# Patient Record
Sex: FEMALE | Race: WHITE | Employment: OTHER | URBAN - METROPOLITAN AREA
[De-identification: names, ages, dates, MRNs, and addresses within clinical notes are randomized per-mention and may not be internally consistent; named-entity substitution may affect disease eponyms.]

---

## 2017-01-01 ENCOUNTER — TELEPHONE (OUTPATIENT)
Dept: FAMILY MEDICINE CLINIC | Age: 67
End: 2017-01-01

## 2017-01-01 ENCOUNTER — CARE COORDINATION (OUTPATIENT)
Dept: CARE COORDINATION | Age: 67
End: 2017-01-01

## 2017-01-01 ENCOUNTER — CARE COORDINATION (OUTPATIENT)
Dept: CASE MANAGEMENT | Age: 67
End: 2017-01-01

## 2017-01-01 ENCOUNTER — OFFICE VISIT (OUTPATIENT)
Dept: FAMILY MEDICINE CLINIC | Age: 67
End: 2017-01-01

## 2017-01-01 ENCOUNTER — CARE COORDINATION (OUTPATIENT)
Dept: MEDSURG UNIT | Age: 67
End: 2017-01-01

## 2017-01-01 ENCOUNTER — TELEPHONE (OUTPATIENT)
Dept: PHARMACY | Facility: CLINIC | Age: 67
End: 2017-01-01

## 2017-01-01 VITALS
WEIGHT: 189.4 LBS | SYSTOLIC BLOOD PRESSURE: 90 MMHG | BODY MASS INDEX: 34.85 KG/M2 | DIASTOLIC BLOOD PRESSURE: 60 MMHG | HEIGHT: 62 IN | HEART RATE: 89 BPM

## 2017-01-01 VITALS
HEART RATE: 78 BPM | SYSTOLIC BLOOD PRESSURE: 118 MMHG | WEIGHT: 190.2 LBS | BODY MASS INDEX: 34.79 KG/M2 | RESPIRATION RATE: 14 BRPM | DIASTOLIC BLOOD PRESSURE: 78 MMHG

## 2017-01-01 VITALS
RESPIRATION RATE: 15 BRPM | SYSTOLIC BLOOD PRESSURE: 102 MMHG | HEART RATE: 87 BPM | BODY MASS INDEX: 35.37 KG/M2 | WEIGHT: 187.2 LBS | DIASTOLIC BLOOD PRESSURE: 68 MMHG

## 2017-01-01 VITALS — DIASTOLIC BLOOD PRESSURE: 70 MMHG | OXYGEN SATURATION: 97 % | SYSTOLIC BLOOD PRESSURE: 110 MMHG | HEART RATE: 79 BPM

## 2017-01-01 DIAGNOSIS — Z09 HOSPITAL DISCHARGE FOLLOW-UP: Primary | ICD-10-CM

## 2017-01-01 DIAGNOSIS — M62.830 BACK SPASM: ICD-10-CM

## 2017-01-01 DIAGNOSIS — N18.31 CHRONIC KIDNEY DISEASE (CKD) STAGE G3A/A1, MODERATELY DECREASED GLOMERULAR FILTRATION RATE (GFR) BETWEEN 45-59 ML/MIN/1.73 SQUARE METER AND ALBUMINURIA CREATININE RATIO LESS THAN 30 MG/G (HCC): ICD-10-CM

## 2017-01-01 DIAGNOSIS — E11.22 TYPE 2 DIABETES MELLITUS WITH STAGE 3 CHRONIC KIDNEY DISEASE, WITHOUT LONG-TERM CURRENT USE OF INSULIN (HCC): Primary | ICD-10-CM

## 2017-01-01 DIAGNOSIS — R10.9 RIGHT FLANK PAIN: ICD-10-CM

## 2017-01-01 DIAGNOSIS — N18.31 CHRONIC KIDNEY DISEASE (CKD) STAGE G3A/A1, MODERATELY DECREASED GLOMERULAR FILTRATION RATE (GFR) BETWEEN 45-59 ML/MIN/1.73 SQUARE METER AND ALBUMINURIA CREATININE RATIO LESS THAN 30 MG/G (HCC): Primary | ICD-10-CM

## 2017-01-01 DIAGNOSIS — I50.40: ICD-10-CM

## 2017-01-01 DIAGNOSIS — R30.0 DYSURIA: Primary | ICD-10-CM

## 2017-01-01 DIAGNOSIS — I42.9 CARDIOMYOPATHY, UNSPECIFIED TYPE (HCC): ICD-10-CM

## 2017-01-01 DIAGNOSIS — M25.40 SWOLLEN JOINT: Primary | ICD-10-CM

## 2017-01-01 DIAGNOSIS — N18.30 TYPE 2 DIABETES MELLITUS WITH STAGE 3 CHRONIC KIDNEY DISEASE, WITHOUT LONG-TERM CURRENT USE OF INSULIN (HCC): ICD-10-CM

## 2017-01-01 DIAGNOSIS — G47.00 INSOMNIA, UNSPECIFIED TYPE: ICD-10-CM

## 2017-01-01 DIAGNOSIS — I50.9 CONGESTIVE HEART FAILURE, UNSPECIFIED CONGESTIVE HEART FAILURE CHRONICITY, UNSPECIFIED CONGESTIVE HEART FAILURE TYPE: Primary | Chronic | ICD-10-CM

## 2017-01-01 DIAGNOSIS — N17.9 ACUTE KIDNEY INJURY (HCC): ICD-10-CM

## 2017-01-01 DIAGNOSIS — F32.A DEPRESSION, UNSPECIFIED DEPRESSION TYPE: Chronic | ICD-10-CM

## 2017-01-01 DIAGNOSIS — E11.22 TYPE 2 DIABETES MELLITUS WITH STAGE 3 CHRONIC KIDNEY DISEASE, WITHOUT LONG-TERM CURRENT USE OF INSULIN (HCC): ICD-10-CM

## 2017-01-01 DIAGNOSIS — M10.9 ACUTE GOUT OF RIGHT HAND, UNSPECIFIED CAUSE: ICD-10-CM

## 2017-01-01 DIAGNOSIS — N18.30 TYPE 2 DIABETES MELLITUS WITH STAGE 3 CHRONIC KIDNEY DISEASE, WITHOUT LONG-TERM CURRENT USE OF INSULIN (HCC): Primary | ICD-10-CM

## 2017-01-01 DIAGNOSIS — Z23 NEED FOR INFLUENZA VACCINATION: ICD-10-CM

## 2017-01-01 DIAGNOSIS — J44.9 CHRONIC OBSTRUCTIVE PULMONARY DISEASE, UNSPECIFIED COPD TYPE (HCC): ICD-10-CM

## 2017-01-01 DIAGNOSIS — F32.5 MAJOR DEPRESSION, SINGLE EPISODE, IN COMPLETE REMISSION (HCC): ICD-10-CM

## 2017-01-01 DIAGNOSIS — M10.9 ACUTE GOUT OF RIGHT HAND, UNSPECIFIED CAUSE: Primary | ICD-10-CM

## 2017-01-01 DIAGNOSIS — F41.9 ANXIETY: ICD-10-CM

## 2017-01-01 LAB
BILIRUBIN, POC: NEGATIVE
BLOOD URINE, POC: ABNORMAL
CLARITY, POC: CLEAR
COLOR, POC: YELLOW
CREATININE URINE: 32.2 MG/DL (ref 28–259)
GLUCOSE URINE, POC: NEGATIVE
HBA1C MFR BLD: 7 %
KETONES, POC: NEGATIVE
LEUKOCYTE EST, POC: ABNORMAL
MICROALBUMIN UR-MCNC: <1.2 MG/DL
MICROALBUMIN/CREAT UR-RTO: NORMAL MG/G (ref 0–30)
NITRITE, POC: NEGATIVE
PH, POC: 5.5
PROTEIN, POC: NEGATIVE
SPECIFIC GRAVITY, POC: 1.01
URIC ACID, SERUM: 13.3 MG/DL (ref 2.6–6)
URINE CULTURE, ROUTINE: NORMAL
UROBILINOGEN, POC: ABNORMAL

## 2017-01-01 PROCEDURE — G8427 DOCREV CUR MEDS BY ELIG CLIN: HCPCS | Performed by: FAMILY MEDICINE

## 2017-01-01 PROCEDURE — 4040F PNEUMOC VAC/ADMIN/RCVD: CPT | Performed by: FAMILY MEDICINE

## 2017-01-01 PROCEDURE — 90662 IIV NO PRSV INCREASED AG IM: CPT | Performed by: FAMILY MEDICINE

## 2017-01-01 PROCEDURE — 3017F COLORECTAL CA SCREEN DOC REV: CPT | Performed by: FAMILY MEDICINE

## 2017-01-01 PROCEDURE — 99213 OFFICE O/P EST LOW 20 MIN: CPT | Performed by: FAMILY MEDICINE

## 2017-01-01 PROCEDURE — G8484 FLU IMMUNIZE NO ADMIN: HCPCS | Performed by: FAMILY MEDICINE

## 2017-01-01 PROCEDURE — G8417 CALC BMI ABV UP PARAM F/U: HCPCS | Performed by: FAMILY MEDICINE

## 2017-01-01 PROCEDURE — 3014F SCREEN MAMMO DOC REV: CPT | Performed by: FAMILY MEDICINE

## 2017-01-01 PROCEDURE — 1036F TOBACCO NON-USER: CPT | Performed by: FAMILY MEDICINE

## 2017-01-01 PROCEDURE — 1123F ACP DISCUSS/DSCN MKR DOCD: CPT | Performed by: FAMILY MEDICINE

## 2017-01-01 PROCEDURE — 1090F PRES/ABSN URINE INCON ASSESS: CPT | Performed by: FAMILY MEDICINE

## 2017-01-01 PROCEDURE — 99495 TRANSJ CARE MGMT MOD F2F 14D: CPT | Performed by: FAMILY MEDICINE

## 2017-01-01 PROCEDURE — 3046F HEMOGLOBIN A1C LEVEL >9.0%: CPT | Performed by: FAMILY MEDICINE

## 2017-01-01 PROCEDURE — G8598 ASA/ANTIPLAT THER USED: HCPCS | Performed by: FAMILY MEDICINE

## 2017-01-01 PROCEDURE — G8926 SPIRO NO PERF OR DOC: HCPCS | Performed by: FAMILY MEDICINE

## 2017-01-01 PROCEDURE — 36415 COLL VENOUS BLD VENIPUNCTURE: CPT | Performed by: FAMILY MEDICINE

## 2017-01-01 PROCEDURE — 1111F DSCHRG MED/CURRENT MED MERGE: CPT | Performed by: FAMILY MEDICINE

## 2017-01-01 PROCEDURE — G8399 PT W/DXA RESULTS DOCUMENT: HCPCS | Performed by: FAMILY MEDICINE

## 2017-01-01 PROCEDURE — G0179 MD RECERTIFICATION HHA PT: HCPCS | Performed by: FAMILY MEDICINE

## 2017-01-01 PROCEDURE — 3023F SPIROM DOC REV: CPT | Performed by: FAMILY MEDICINE

## 2017-01-01 PROCEDURE — 83036 HEMOGLOBIN GLYCOSYLATED A1C: CPT | Performed by: FAMILY MEDICINE

## 2017-01-01 PROCEDURE — 3045F PR MOST RECENT HEMOGLOBIN A1C LEVEL 7.0-9.0%: CPT | Performed by: FAMILY MEDICINE

## 2017-01-01 PROCEDURE — G0008 ADMIN INFLUENZA VIRUS VAC: HCPCS | Performed by: FAMILY MEDICINE

## 2017-01-01 PROCEDURE — 81002 URINALYSIS NONAUTO W/O SCOPE: CPT | Performed by: FAMILY MEDICINE

## 2017-01-01 RX ORDER — ALLOPURINOL 100 MG/1
100 TABLET ORAL DAILY
Qty: 30 TABLET | Refills: 0 | Status: SHIPPED | OUTPATIENT
Start: 2017-01-01 | End: 2017-01-01 | Stop reason: SDUPTHER

## 2017-01-01 RX ORDER — ATORVASTATIN CALCIUM 40 MG/1
40 TABLET, FILM COATED ORAL DAILY
Qty: 90 TABLET | Refills: 2 | Status: SHIPPED | OUTPATIENT
Start: 2017-01-01

## 2017-01-01 RX ORDER — HYDROCODONE BITARTRATE AND ACETAMINOPHEN 5; 325 MG/1; MG/1
1 TABLET ORAL EVERY 8 HOURS PRN
Qty: 30 TABLET | Refills: 0 | Status: SHIPPED | OUTPATIENT
Start: 2017-01-01 | End: 2017-01-01 | Stop reason: SDUPTHER

## 2017-01-01 RX ORDER — SOTALOL HYDROCHLORIDE 80 MG/1
80 TABLET ORAL 2 TIMES DAILY
Status: ON HOLD | COMMUNITY
End: 2017-01-01 | Stop reason: HOSPADM

## 2017-01-01 RX ORDER — TRAZODONE HYDROCHLORIDE 50 MG/1
50 TABLET ORAL NIGHTLY
Qty: 90 TABLET | Refills: 3 | Status: SHIPPED | OUTPATIENT
Start: 2017-01-01

## 2017-01-01 RX ORDER — ALLOPURINOL 100 MG/1
100 TABLET ORAL DAILY
Qty: 90 TABLET | Refills: 3 | Status: SHIPPED | OUTPATIENT
Start: 2017-01-01

## 2017-01-01 RX ORDER — HYDROCODONE BITARTRATE AND ACETAMINOPHEN 5; 325 MG/1; MG/1
1 TABLET ORAL EVERY 8 HOURS PRN
Qty: 30 TABLET | Refills: 0 | Status: SHIPPED | OUTPATIENT
Start: 2017-01-01

## 2017-01-01 RX ORDER — DIAZEPAM 2 MG/1
2 TABLET ORAL EVERY 8 HOURS PRN
Qty: 10 TABLET | Refills: 0 | Status: SHIPPED | OUTPATIENT
Start: 2017-01-01 | End: 2018-01-01 | Stop reason: SDUPTHER

## 2017-01-01 RX ORDER — COLCHICINE 0.6 MG/1
0.6 TABLET ORAL 2 TIMES DAILY
Qty: 60 TABLET | Refills: 0 | Status: SHIPPED | OUTPATIENT
Start: 2017-01-01 | End: 2018-01-01

## 2017-01-01 ASSESSMENT — PATIENT HEALTH QUESTIONNAIRE - PHQ9
2. FEELING DOWN, DEPRESSED OR HOPELESS: 0
SUM OF ALL RESPONSES TO PHQ QUESTIONS 1-9: 0
SUM OF ALL RESPONSES TO PHQ9 QUESTIONS 1 & 2: 0
1. LITTLE INTEREST OR PLEASURE IN DOING THINGS: 0

## 2017-01-01 ASSESSMENT — ENCOUNTER SYMPTOMS
BACK PAIN: 1
BACK PAIN: 1

## 2017-01-04 ENCOUNTER — OFFICE VISIT (OUTPATIENT)
Dept: CARDIOLOGY CLINIC | Age: 67
End: 2017-01-04

## 2017-01-04 VITALS
WEIGHT: 206.2 LBS | BODY MASS INDEX: 37.94 KG/M2 | HEART RATE: 60 BPM | DIASTOLIC BLOOD PRESSURE: 54 MMHG | SYSTOLIC BLOOD PRESSURE: 118 MMHG | HEIGHT: 62 IN

## 2017-01-04 DIAGNOSIS — Z95.810 BIVENTRICULAR ICD (IMPLANTABLE CARDIOVERTER-DEFIBRILLATOR) IN PLACE: Primary | ICD-10-CM

## 2017-01-04 DIAGNOSIS — Z95.810 STATUS POST INTERNAL CARDIAC DEFIBRILLATOR PROCEDURE: ICD-10-CM

## 2017-01-04 PROCEDURE — 1036F TOBACCO NON-USER: CPT | Performed by: INTERNAL MEDICINE

## 2017-01-04 PROCEDURE — 93284 PRGRMG EVAL IMPLANTABLE DFB: CPT | Performed by: INTERNAL MEDICINE

## 2017-01-04 RX ORDER — GLIMEPIRIDE 2 MG/1
2 TABLET ORAL
COMMUNITY
End: 2017-02-14 | Stop reason: ALTCHOICE

## 2017-01-12 ENCOUNTER — TELEPHONE (OUTPATIENT)
Dept: CARDIOLOGY CLINIC | Age: 67
End: 2017-01-12

## 2017-01-18 ENCOUNTER — CARE COORDINATION (OUTPATIENT)
Dept: CASE MANAGEMENT | Age: 67
End: 2017-01-18

## 2017-01-19 ENCOUNTER — TELEPHONE (OUTPATIENT)
Dept: PHARMACY | Facility: CLINIC | Age: 67
End: 2017-01-19

## 2017-01-23 ENCOUNTER — TELEPHONE (OUTPATIENT)
Dept: FAMILY MEDICINE CLINIC | Age: 67
End: 2017-01-23

## 2017-01-24 ENCOUNTER — CARE COORDINATION (OUTPATIENT)
Dept: CASE MANAGEMENT | Age: 67
End: 2017-01-24

## 2017-01-27 ENCOUNTER — HOSPITAL ENCOUNTER (OUTPATIENT)
Dept: OTHER | Age: 67
Discharge: HOME | End: 2017-01-27
Attending: FAMILY MEDICINE | Admitting: FAMILY MEDICINE

## 2017-01-27 ENCOUNTER — CARE COORDINATION (OUTPATIENT)
Dept: CASE MANAGEMENT | Age: 67
End: 2017-01-27

## 2017-01-27 LAB
ANION GAP SERPL CALCULATED.3IONS-SCNC: 18 MMOL/L (ref 4–16)
BUN BLDV-MCNC: 21 MG/DL (ref 6–23)
CALCIUM SERPL-MCNC: 9.4 MG/DL (ref 8.3–10.6)
CHLORIDE BLD-SCNC: 93 MMOL/L (ref 99–110)
CO2: 24 MMOL/L (ref 21–32)
CREAT SERPL-MCNC: 1.1 MG/DL (ref 0.6–1.1)
GFR AFRICAN AMERICAN: 60 ML/MIN/1.73M2
GFR NON-AFRICAN AMERICAN: 50 ML/MIN/1.73M2
GLUCOSE BLD-MCNC: 139 MG/DL (ref 70–140)
POTASSIUM SERPL-SCNC: 4.9 MMOL/L (ref 3.5–5.1)
SODIUM BLD-SCNC: 135 MMOL/L (ref 135–145)

## 2017-02-02 ENCOUNTER — HOSPITAL ENCOUNTER (OUTPATIENT)
Dept: OTHER | Age: 67
Discharge: HOME | End: 2017-02-02
Attending: FAMILY MEDICINE | Admitting: FAMILY MEDICINE

## 2017-02-02 LAB
ANION GAP SERPL CALCULATED.3IONS-SCNC: 14 MMOL/L (ref 4–16)
BUN BLDV-MCNC: 27 MG/DL (ref 6–23)
CALCIUM SERPL-MCNC: 10 MG/DL (ref 8.3–10.6)
CHLORIDE BLD-SCNC: 96 MMOL/L (ref 99–110)
CO2: 29 MMOL/L (ref 21–32)
CREAT SERPL-MCNC: 1.4 MG/DL (ref 0.6–1.1)
GFR AFRICAN AMERICAN: 45 ML/MIN/1.73M2
GFR NON-AFRICAN AMERICAN: 38 ML/MIN/1.73M2
GLUCOSE BLD-MCNC: 143 MG/DL (ref 70–140)
POTASSIUM SERPL-SCNC: 4.6 MMOL/L (ref 3.5–5.1)
SODIUM BLD-SCNC: 139 MMOL/L (ref 135–145)

## 2017-02-07 ENCOUNTER — TELEPHONE (OUTPATIENT)
Dept: FAMILY MEDICINE CLINIC | Age: 67
End: 2017-02-07

## 2017-02-07 ENCOUNTER — OFFICE VISIT (OUTPATIENT)
Dept: FAMILY MEDICINE CLINIC | Age: 67
End: 2017-02-07

## 2017-02-07 ENCOUNTER — CARE COORDINATION (OUTPATIENT)
Dept: CASE MANAGEMENT | Age: 67
End: 2017-02-07

## 2017-02-07 VITALS
HEIGHT: 62 IN | DIASTOLIC BLOOD PRESSURE: 50 MMHG | SYSTOLIC BLOOD PRESSURE: 110 MMHG | BODY MASS INDEX: 36.99 KG/M2 | HEART RATE: 67 BPM | TEMPERATURE: 97.6 F | WEIGHT: 201 LBS

## 2017-02-07 DIAGNOSIS — R04.2 COUGHING UP BLOOD: ICD-10-CM

## 2017-02-07 DIAGNOSIS — M62.830 BACK SPASM: Primary | ICD-10-CM

## 2017-02-07 DIAGNOSIS — I50.23 ACUTE ON CHRONIC SYSTOLIC CONGESTIVE HEART FAILURE (HCC): Chronic | ICD-10-CM

## 2017-02-07 PROCEDURE — 3014F SCREEN MAMMO DOC REV: CPT | Performed by: FAMILY MEDICINE

## 2017-02-07 PROCEDURE — G8484 FLU IMMUNIZE NO ADMIN: HCPCS | Performed by: FAMILY MEDICINE

## 2017-02-07 PROCEDURE — 1036F TOBACCO NON-USER: CPT | Performed by: FAMILY MEDICINE

## 2017-02-07 PROCEDURE — 1123F ACP DISCUSS/DSCN MKR DOCD: CPT | Performed by: FAMILY MEDICINE

## 2017-02-07 PROCEDURE — 1111F DSCHRG MED/CURRENT MED MERGE: CPT | Performed by: FAMILY MEDICINE

## 2017-02-07 PROCEDURE — 3017F COLORECTAL CA SCREEN DOC REV: CPT | Performed by: FAMILY MEDICINE

## 2017-02-07 PROCEDURE — G8419 CALC BMI OUT NRM PARAM NOF/U: HCPCS | Performed by: FAMILY MEDICINE

## 2017-02-07 PROCEDURE — G8399 PT W/DXA RESULTS DOCUMENT: HCPCS | Performed by: FAMILY MEDICINE

## 2017-02-07 PROCEDURE — 4040F PNEUMOC VAC/ADMIN/RCVD: CPT | Performed by: FAMILY MEDICINE

## 2017-02-07 PROCEDURE — 99214 OFFICE O/P EST MOD 30 MIN: CPT | Performed by: FAMILY MEDICINE

## 2017-02-07 PROCEDURE — 1090F PRES/ABSN URINE INCON ASSESS: CPT | Performed by: FAMILY MEDICINE

## 2017-02-07 PROCEDURE — G8427 DOCREV CUR MEDS BY ELIG CLIN: HCPCS | Performed by: FAMILY MEDICINE

## 2017-02-07 RX ORDER — BACLOFEN 10 MG/1
10 TABLET ORAL 2 TIMES DAILY
Qty: 60 TABLET | Refills: 0 | Status: SHIPPED | OUTPATIENT
Start: 2017-02-07 | End: 2017-04-20 | Stop reason: SDUPTHER

## 2017-02-07 RX ORDER — FLUTICASONE FUROATE AND VILANTEROL 100; 25 UG/1; UG/1
1 POWDER RESPIRATORY (INHALATION) PRN
COMMUNITY
End: 2018-01-01

## 2017-02-07 ASSESSMENT — ENCOUNTER SYMPTOMS
COUGH: 1
BACK PAIN: 1

## 2017-02-08 ENCOUNTER — CARE COORDINATION (OUTPATIENT)
Dept: CARE COORDINATION | Age: 67
End: 2017-02-08

## 2017-02-08 DIAGNOSIS — N28.9 ABNORMAL RENAL FUNCTION: Primary | ICD-10-CM

## 2017-02-09 ENCOUNTER — TELEPHONE (OUTPATIENT)
Dept: FAMILY MEDICINE CLINIC | Age: 67
End: 2017-02-09

## 2017-02-09 DIAGNOSIS — E11.22 TYPE 2 DIABETES MELLITUS WITH STAGE 3 CHRONIC KIDNEY DISEASE, WITHOUT LONG-TERM CURRENT USE OF INSULIN (HCC): Primary | ICD-10-CM

## 2017-02-09 DIAGNOSIS — N18.30 TYPE 2 DIABETES MELLITUS WITH STAGE 3 CHRONIC KIDNEY DISEASE, WITHOUT LONG-TERM CURRENT USE OF INSULIN (HCC): Primary | ICD-10-CM

## 2017-02-09 RX ORDER — BLOOD-GLUCOSE METER
1 KIT MISCELLANEOUS DAILY
Qty: 1 KIT | Refills: 0 | Status: SHIPPED | OUTPATIENT
Start: 2017-02-09 | End: 2017-02-10 | Stop reason: SDUPTHER

## 2017-02-10 ENCOUNTER — TELEPHONE (OUTPATIENT)
Dept: FAMILY MEDICINE CLINIC | Age: 67
End: 2017-02-10

## 2017-02-10 DIAGNOSIS — E11.22 TYPE 2 DIABETES MELLITUS WITH STAGE 3 CHRONIC KIDNEY DISEASE, WITHOUT LONG-TERM CURRENT USE OF INSULIN (HCC): ICD-10-CM

## 2017-02-10 DIAGNOSIS — N18.30 TYPE 2 DIABETES MELLITUS WITH STAGE 3 CHRONIC KIDNEY DISEASE, WITHOUT LONG-TERM CURRENT USE OF INSULIN (HCC): ICD-10-CM

## 2017-02-10 LAB
BUN BLDV-MCNC: 31 MG/DL
CALCIUM SERPL-MCNC: 10 MG/DL
CHLORIDE BLD-SCNC: 98 MMOL/L
CO2: 29 MMOL/L
CREAT SERPL-MCNC: 1.3 MG/DL
GFR CALCULATED: 41
GLUCOSE BLD-MCNC: 165 MG/DL
POTASSIUM SERPL-SCNC: 4.5 MMOL/L
SODIUM BLD-SCNC: 139 MMOL/L

## 2017-02-10 RX ORDER — BLOOD-GLUCOSE METER
1 KIT MISCELLANEOUS DAILY
Qty: 1 KIT | Refills: 0 | Status: SHIPPED | OUTPATIENT
Start: 2017-02-10

## 2017-02-14 ENCOUNTER — TELEPHONE (OUTPATIENT)
Dept: FAMILY MEDICINE CLINIC | Age: 67
End: 2017-02-14

## 2017-02-14 DIAGNOSIS — E11.9 TYPE 2 DIABETES MELLITUS WITHOUT COMPLICATION (HCC): Chronic | ICD-10-CM

## 2017-02-14 RX ORDER — GLIMEPIRIDE 2 MG/1
TABLET ORAL
Qty: 90 TABLET | Refills: 3 | OUTPATIENT
Start: 2017-02-14

## 2017-02-24 ENCOUNTER — CARE COORDINATION (OUTPATIENT)
Dept: CARE COORDINATION | Age: 67
End: 2017-02-24

## 2017-02-25 LAB — TSH SERPL DL<=0.05 MIU/L-ACNC: 0.99 UIU/ML

## 2017-03-20 ENCOUNTER — TELEPHONE (OUTPATIENT)
Dept: FAMILY MEDICINE CLINIC | Age: 67
End: 2017-03-20

## 2017-03-22 ENCOUNTER — CARE COORDINATION (OUTPATIENT)
Dept: CARE COORDINATION | Age: 67
End: 2017-03-22

## 2017-03-22 ENCOUNTER — OFFICE VISIT (OUTPATIENT)
Dept: FAMILY MEDICINE CLINIC | Age: 67
End: 2017-03-22

## 2017-03-22 VITALS
DIASTOLIC BLOOD PRESSURE: 60 MMHG | SYSTOLIC BLOOD PRESSURE: 122 MMHG | HEART RATE: 62 BPM | HEIGHT: 62 IN | BODY MASS INDEX: 34.78 KG/M2 | WEIGHT: 189 LBS

## 2017-03-22 DIAGNOSIS — I50.40 COMBINED SYSTOLIC AND DIASTOLIC CONGESTIVE HEART FAILURE, NYHA CLASS 3 (HCC): ICD-10-CM

## 2017-03-22 DIAGNOSIS — G93.40 ACUTE ENCEPHALOPATHY: ICD-10-CM

## 2017-03-22 DIAGNOSIS — R60.0 PEDAL EDEMA: ICD-10-CM

## 2017-03-22 DIAGNOSIS — J44.9 CHRONIC AIRWAY OBSTRUCTION, NOT ELSEWHERE CLASSIFIED: Chronic | ICD-10-CM

## 2017-03-22 DIAGNOSIS — Z09 HOSPITAL DISCHARGE FOLLOW-UP: Primary | ICD-10-CM

## 2017-03-22 DIAGNOSIS — M62.830 BACK SPASM: ICD-10-CM

## 2017-03-22 DIAGNOSIS — N39.41 URINARY INCONTINENCE, URGE: ICD-10-CM

## 2017-03-22 DIAGNOSIS — M72.2 PLANTAR FASCIITIS, RIGHT: ICD-10-CM

## 2017-03-22 DIAGNOSIS — R54 AGE-RELATED PHYSICAL DEBILITY: ICD-10-CM

## 2017-03-22 RX ORDER — HYDROCODONE BITARTRATE AND ACETAMINOPHEN 5; 325 MG/1; MG/1
1 TABLET ORAL 2 TIMES DAILY PRN
Qty: 60 TABLET | Refills: 0 | Status: SHIPPED | OUTPATIENT
Start: 2017-03-22 | End: 2017-06-14 | Stop reason: SDUPTHER

## 2017-03-22 RX ORDER — HYDROCODONE BITARTRATE AND ACETAMINOPHEN 5; 325 MG/1; MG/1
1 TABLET ORAL 2 TIMES DAILY PRN
Qty: 60 TABLET | Refills: 0 | Status: SHIPPED | OUTPATIENT
Start: 2017-03-22 | End: 2017-03-23 | Stop reason: CLARIF

## 2017-03-22 RX ORDER — TORSEMIDE 20 MG/1
20 TABLET ORAL 2 TIMES DAILY
Status: ON HOLD | COMMUNITY
Start: 2017-03-06 | End: 2017-01-01

## 2017-03-23 ENCOUNTER — TELEPHONE (OUTPATIENT)
Dept: FAMILY MEDICINE CLINIC | Age: 67
End: 2017-03-23

## 2017-03-23 PROBLEM — M62.830 BACK SPASM: Status: ACTIVE | Noted: 2017-03-23

## 2017-03-23 PROBLEM — R54 AGE-RELATED PHYSICAL DEBILITY: Status: ACTIVE | Noted: 2017-03-23

## 2017-03-23 PROBLEM — I50.40 COMBINED SYSTOLIC AND DIASTOLIC CONGESTIVE HEART FAILURE, NYHA CLASS 3 (HCC): Status: ACTIVE | Noted: 2017-03-23

## 2017-03-29 ENCOUNTER — TELEPHONE (OUTPATIENT)
Dept: FAMILY MEDICINE CLINIC | Age: 67
End: 2017-03-29

## 2017-03-30 ENCOUNTER — TELEPHONE (OUTPATIENT)
Dept: FAMILY MEDICINE CLINIC | Age: 67
End: 2017-03-30

## 2017-03-31 ENCOUNTER — TELEPHONE (OUTPATIENT)
Dept: FAMILY MEDICINE CLINIC | Age: 67
End: 2017-03-31

## 2017-03-31 DIAGNOSIS — J44.9 CHRONIC AIRWAY OBSTRUCTION, NOT ELSEWHERE CLASSIFIED: Primary | Chronic | ICD-10-CM

## 2017-04-13 ENCOUNTER — TELEPHONE (OUTPATIENT)
Dept: FAMILY MEDICINE CLINIC | Age: 67
End: 2017-04-13

## 2017-04-18 PROCEDURE — 99496 TRANSJ CARE MGMT HIGH F2F 7D: CPT | Performed by: FAMILY MEDICINE

## 2017-04-20 ENCOUNTER — OFFICE VISIT (OUTPATIENT)
Dept: FAMILY MEDICINE CLINIC | Age: 67
End: 2017-04-20

## 2017-04-20 VITALS — DIASTOLIC BLOOD PRESSURE: 60 MMHG | HEART RATE: 74 BPM | HEIGHT: 62 IN | SYSTOLIC BLOOD PRESSURE: 108 MMHG

## 2017-04-20 DIAGNOSIS — M62.830 BACK SPASM: ICD-10-CM

## 2017-04-20 DIAGNOSIS — N18.30 TYPE 2 DIABETES MELLITUS WITH STAGE 3 CHRONIC KIDNEY DISEASE, WITHOUT LONG-TERM CURRENT USE OF INSULIN (HCC): Primary | ICD-10-CM

## 2017-04-20 DIAGNOSIS — E11.22 TYPE 2 DIABETES MELLITUS WITH STAGE 3 CHRONIC KIDNEY DISEASE, WITHOUT LONG-TERM CURRENT USE OF INSULIN (HCC): Primary | ICD-10-CM

## 2017-04-20 DIAGNOSIS — G47.00 INSOMNIA, UNSPECIFIED TYPE: ICD-10-CM

## 2017-04-20 LAB — HBA1C MFR BLD: 6.8 %

## 2017-04-20 PROCEDURE — 3017F COLORECTAL CA SCREEN DOC REV: CPT | Performed by: FAMILY MEDICINE

## 2017-04-20 PROCEDURE — 1123F ACP DISCUSS/DSCN MKR DOCD: CPT | Performed by: FAMILY MEDICINE

## 2017-04-20 PROCEDURE — 83036 HEMOGLOBIN GLYCOSYLATED A1C: CPT | Performed by: FAMILY MEDICINE

## 2017-04-20 PROCEDURE — 99213 OFFICE O/P EST LOW 20 MIN: CPT | Performed by: FAMILY MEDICINE

## 2017-04-20 PROCEDURE — 1036F TOBACCO NON-USER: CPT | Performed by: FAMILY MEDICINE

## 2017-04-20 PROCEDURE — 3014F SCREEN MAMMO DOC REV: CPT | Performed by: FAMILY MEDICINE

## 2017-04-20 PROCEDURE — 3044F HG A1C LEVEL LT 7.0%: CPT | Performed by: FAMILY MEDICINE

## 2017-04-20 PROCEDURE — G8417 CALC BMI ABV UP PARAM F/U: HCPCS | Performed by: FAMILY MEDICINE

## 2017-04-20 PROCEDURE — G8427 DOCREV CUR MEDS BY ELIG CLIN: HCPCS | Performed by: FAMILY MEDICINE

## 2017-04-20 PROCEDURE — G8399 PT W/DXA RESULTS DOCUMENT: HCPCS | Performed by: FAMILY MEDICINE

## 2017-04-20 PROCEDURE — 4040F PNEUMOC VAC/ADMIN/RCVD: CPT | Performed by: FAMILY MEDICINE

## 2017-04-20 PROCEDURE — 1090F PRES/ABSN URINE INCON ASSESS: CPT | Performed by: FAMILY MEDICINE

## 2017-04-20 RX ORDER — DIPHENHYDRAMINE HCL 25 MG
TABLET ORAL
Refills: 0 | COMMUNITY
Start: 2017-02-11 | End: 2017-05-12 | Stop reason: SDUPTHER

## 2017-04-20 RX ORDER — TRAZODONE HYDROCHLORIDE 50 MG/1
50 TABLET ORAL NIGHTLY
Qty: 30 TABLET | Refills: 5 | Status: SHIPPED | OUTPATIENT
Start: 2017-04-20 | End: 2017-01-01 | Stop reason: SDUPTHER

## 2017-04-20 RX ORDER — BACLOFEN 10 MG/1
10 TABLET ORAL 2 TIMES DAILY
Qty: 60 TABLET | Refills: 0 | Status: SHIPPED | OUTPATIENT
Start: 2017-04-20 | End: 2017-01-01

## 2017-04-20 RX ORDER — LEVOTHYROXINE SODIUM 0.03 MG/1
25 TABLET ORAL DAILY
Qty: 30 TABLET | Refills: 11 | Status: SHIPPED | OUTPATIENT
Start: 2017-04-20 | End: 2018-01-01 | Stop reason: SDUPTHER

## 2017-04-21 ASSESSMENT — ENCOUNTER SYMPTOMS: BACK PAIN: 1

## 2017-04-24 ENCOUNTER — TELEPHONE (OUTPATIENT)
Dept: FAMILY MEDICINE CLINIC | Age: 67
End: 2017-04-24

## 2017-05-12 ENCOUNTER — TELEPHONE (OUTPATIENT)
Dept: FAMILY MEDICINE CLINIC | Age: 67
End: 2017-05-12

## 2017-05-12 DIAGNOSIS — N18.30 TYPE 2 DIABETES MELLITUS WITH STAGE 3 CHRONIC KIDNEY DISEASE, WITHOUT LONG-TERM CURRENT USE OF INSULIN (HCC): Primary | ICD-10-CM

## 2017-05-12 DIAGNOSIS — E11.22 TYPE 2 DIABETES MELLITUS WITH STAGE 3 CHRONIC KIDNEY DISEASE, WITHOUT LONG-TERM CURRENT USE OF INSULIN (HCC): Primary | ICD-10-CM

## 2017-05-12 RX ORDER — DIPHENHYDRAMINE HCL 25 MG
TABLET ORAL
Qty: 1 KIT | Refills: 0 | Status: SHIPPED | OUTPATIENT
Start: 2017-05-12

## 2017-05-24 LAB — DIABETIC RETINOPATHY: NORMAL

## 2017-05-29 DIAGNOSIS — N18.30 TYPE 2 DIABETES MELLITUS WITH STAGE 3 CHRONIC KIDNEY DISEASE, WITHOUT LONG-TERM CURRENT USE OF INSULIN (HCC): Primary | ICD-10-CM

## 2017-05-29 DIAGNOSIS — E11.22 TYPE 2 DIABETES MELLITUS WITH STAGE 3 CHRONIC KIDNEY DISEASE, WITHOUT LONG-TERM CURRENT USE OF INSULIN (HCC): Primary | ICD-10-CM

## 2017-05-29 RX ORDER — ATORVASTATIN CALCIUM 40 MG/1
40 TABLET, FILM COATED ORAL DAILY
Qty: 30 TABLET | Refills: 0 | Status: SHIPPED | OUTPATIENT
Start: 2017-05-29 | End: 2017-06-14 | Stop reason: SDUPTHER

## 2017-06-01 ENCOUNTER — CARE COORDINATION (OUTPATIENT)
Dept: CARE COORDINATION | Age: 67
End: 2017-06-01

## 2017-06-05 ENCOUNTER — TELEPHONE (OUTPATIENT)
Dept: FAMILY MEDICINE CLINIC | Age: 67
End: 2017-06-05

## 2017-06-14 ENCOUNTER — OFFICE VISIT (OUTPATIENT)
Dept: FAMILY MEDICINE CLINIC | Age: 67
End: 2017-06-14

## 2017-06-14 VITALS
HEART RATE: 72 BPM | DIASTOLIC BLOOD PRESSURE: 48 MMHG | WEIGHT: 186 LBS | BODY MASS INDEX: 34.23 KG/M2 | SYSTOLIC BLOOD PRESSURE: 88 MMHG | HEIGHT: 62 IN

## 2017-06-14 DIAGNOSIS — N18.30 TYPE 2 DIABETES MELLITUS WITH STAGE 3 CHRONIC KIDNEY DISEASE, WITHOUT LONG-TERM CURRENT USE OF INSULIN (HCC): Primary | ICD-10-CM

## 2017-06-14 DIAGNOSIS — E11.22 TYPE 2 DIABETES MELLITUS WITH STAGE 3 CHRONIC KIDNEY DISEASE, WITHOUT LONG-TERM CURRENT USE OF INSULIN (HCC): Primary | ICD-10-CM

## 2017-06-14 DIAGNOSIS — M62.830 BACK SPASM: ICD-10-CM

## 2017-06-14 DIAGNOSIS — I50.9 CONGESTIVE HEART FAILURE, UNSPECIFIED CONGESTIVE HEART FAILURE CHRONICITY, UNSPECIFIED CONGESTIVE HEART FAILURE TYPE: Chronic | ICD-10-CM

## 2017-06-14 LAB — HBA1C MFR BLD: 7 %

## 2017-06-14 PROCEDURE — 1123F ACP DISCUSS/DSCN MKR DOCD: CPT | Performed by: FAMILY MEDICINE

## 2017-06-14 PROCEDURE — G8427 DOCREV CUR MEDS BY ELIG CLIN: HCPCS | Performed by: FAMILY MEDICINE

## 2017-06-14 PROCEDURE — 1090F PRES/ABSN URINE INCON ASSESS: CPT | Performed by: FAMILY MEDICINE

## 2017-06-14 PROCEDURE — 3014F SCREEN MAMMO DOC REV: CPT | Performed by: FAMILY MEDICINE

## 2017-06-14 PROCEDURE — G8417 CALC BMI ABV UP PARAM F/U: HCPCS | Performed by: FAMILY MEDICINE

## 2017-06-14 PROCEDURE — 83036 HEMOGLOBIN GLYCOSYLATED A1C: CPT | Performed by: FAMILY MEDICINE

## 2017-06-14 PROCEDURE — 99214 OFFICE O/P EST MOD 30 MIN: CPT | Performed by: FAMILY MEDICINE

## 2017-06-14 PROCEDURE — 4040F PNEUMOC VAC/ADMIN/RCVD: CPT | Performed by: FAMILY MEDICINE

## 2017-06-14 PROCEDURE — 3046F HEMOGLOBIN A1C LEVEL >9.0%: CPT | Performed by: FAMILY MEDICINE

## 2017-06-14 PROCEDURE — 1036F TOBACCO NON-USER: CPT | Performed by: FAMILY MEDICINE

## 2017-06-14 PROCEDURE — 3017F COLORECTAL CA SCREEN DOC REV: CPT | Performed by: FAMILY MEDICINE

## 2017-06-14 PROCEDURE — G8399 PT W/DXA RESULTS DOCUMENT: HCPCS | Performed by: FAMILY MEDICINE

## 2017-06-14 RX ORDER — HYDROCODONE BITARTRATE AND ACETAMINOPHEN 5; 325 MG/1; MG/1
1 TABLET ORAL EVERY 8 HOURS PRN
Qty: 30 TABLET | Refills: 0 | Status: SHIPPED | OUTPATIENT
Start: 2017-06-14 | End: 2017-01-01

## 2017-06-14 RX ORDER — ATORVASTATIN CALCIUM 40 MG/1
40 TABLET, FILM COATED ORAL DAILY
Qty: 30 TABLET | Refills: 11 | Status: SHIPPED | OUTPATIENT
Start: 2017-06-14 | End: 2017-01-01 | Stop reason: SDUPTHER

## 2017-06-14 RX ORDER — SOTALOL HYDROCHLORIDE 80 MG/1
40 TABLET ORAL EVERY EVENING
Refills: 11 | Status: ON HOLD | COMMUNITY
Start: 2017-05-24 | End: 2017-01-01 | Stop reason: HOSPADM

## 2017-06-14 RX ORDER — HYDROCODONE BITARTRATE AND ACETAMINOPHEN 5; 325 MG/1; MG/1
1 TABLET ORAL EVERY 8 HOURS PRN
Qty: 30 TABLET | Refills: 0 | Status: SHIPPED | OUTPATIENT
Start: 2017-06-14 | End: 2017-01-01 | Stop reason: SDUPTHER

## 2017-06-14 ASSESSMENT — ENCOUNTER SYMPTOMS: BACK PAIN: 1

## 2017-08-11 PROBLEM — E03.9 HYPOTHYROIDISM: Status: ACTIVE | Noted: 2017-01-01

## 2017-09-14 PROBLEM — M62.830 BACK SPASM: Status: RESOLVED | Noted: 2017-03-23 | Resolved: 2017-01-01

## 2017-10-02 NOTE — TELEPHONE ENCOUNTER
Patient stated she was seen at [facility] ED on 10/1/2018 for SOB. Patient stated she  If feeling much better and does not have any SOB. Patient decline follow up appointment. She stated she will call back to schedule an appointment when needed.

## 2017-10-04 NOTE — CARE COORDINATION
Ambulatory Care Coordination Note  10/4/2017  CM Risk Score: 13  Karoline Mortality Risk Score: 17.64    ACC: Sarah Emanuel, RN    Summary Note: Call to pt to follow up after recent ED visit. Pt states she was having shortness of breath that was not relieved with her rescue inhaler or increased O2 therapy. Pt reports shortness of breath slowly increased over a 3 day span. She states that for a few days prior, she had been experiencing vomiting and nausea so she's not sure if that had something to do with it. Pt denies any weight gain or swelling of the hands, feet or legs that would indicate a CHF exacerbation prior to going to the ED. Pt states that the Lasix she received in the ED seemed to do better than the Torsemide she takes at home. She denies excessive fluid intake prior to ED visit or after. Pt also states that she does not use maintenance inhalers: Spiriva or Breo, stating that Dr. Dayanara Goode told her she didn't have to take them due to not having been a smoker or ever having asthma. Will route this information to Dr. Dayanara Goode for approval to remove these medications from the pt active med list.  Will continue to follow to reiterate Zone Management for CHF. Care Coordination Interventions    Program Enrollment:  Complex Care   Referral from Primary Care Provider:  No   Suggested Interventions and Community Resources   Fall Risk Prevention: In Process   Home Health Services: In Process   Zone Management Tools: In Process             Goals Addressed      Conditions and Symptoms                  I will follow my Zone Management tool to seek urgent or emergent care. I will notify my provider of any symptoms that indicate a worsening of my condition.     Barriers: overwhelmed by complexity of regimen and lack of education  Plan for overcoming my barriers: Patient will be educated on s/s that require emergent care according to Zone Management tool  Confidence: 5/10  Anticipated Goal Completion Date: 12/04/2017              Prior to Admission medications    Medication Sig Start Date End Date Taking? Authorizing Provider   traZODone (DESYREL) 50 MG tablet Take 1 tablet by mouth nightly 9/14/17   Génesis Mccormick MD   HYDROcodone-acetaminophen [facility]) 5-325 MG per tablet Take 1 tablet by mouth every 8 hours as needed for Pain . 9/14/17   Génesis Mccormick MD   tiotropium (Canda Sake) 18 MCG inhalation capsule Inhale 1 capsule into the lungs daily 9/14/17   Génesis Mccormick MD   atorvastatin (LIPITOR) 40 MG tablet Take 1 tablet by mouth daily 9/1/17   Génesis Mccormick MD   sotalol (BETAPACE) 80 MG tablet Take 40 mg by mouth every evening 5/24/17   Historical Provider, MD   Blood Glucose Monitoring Suppl (TRUE METRIX AIR GLUCOSE METER) W/DEVICE KIT Us prn 5/12/17   Génesis Mccormick MD   levothyroxine (SYNTHROID) 25 MCG tablet Take 1 tablet by mouth Daily 4/20/17   Génesis Mccormick MD   tiotropium (Canda Sake) 18 MCG inhalation capsule Inhale 1 capsule into the lungs daily 3/31/17   Génesis Mccormick MD   torsemide (DEMADEX) 20 MG tablet Take 20 mg by mouth 2 times daily Indications: Pt takes 2 tablets 2 times daily  3/6/17   Yobani Jama MD   sacubitril-valsartan (ENTRESTO) 24-26 MG per tablet Take 1 tablet by mouth 2 times daily    Roberto Saleh MD   [facility] Devices (COMMODE BEDSIDE) MISC Use prn 3/22/17   Génesis Mccormick MD   Elastic Bandages & Supports (PLANTAR FASCIITIS ARCH SLEEVE) [address] [facility] Wear nightly 3/22/17   Génesis Mccormick MD   glucose blood VI test strips (EXACTECH TEST) strip 1 each by In Vitro route daily As needed.  2/10/17   Génesis Mccormick MD   glucose monitoring kit (FREESTYLE) monitoring kit 1 kit by Does not apply route daily Dispense whatever brand is covered by insurance 2/10/17   Génesis Mccormick MD   fluticasone-vilanterol (BREO ELLIPTA) 100-25 MCG/INH AEPB inhaler Inhale 1 puff into the lungs as needed    Historical Provider, MD   midodrine (PROAMATINE) 10 MG tablet Take 10 mg by mouth 3 times daily    Yareli Melton MD   Misc. Devices [facility]) [address] [facility] [address] [facility] wheelchair 7/6/16   Historical Provider, MD   PROVENTIL  (90 BASE) MCG/ACT inhaler Inhale 2 puffs into the lungs every 6 hours as needed for Wheezing 3/2/16   Bayron Burgos MD   aspirin 81 MG tablet Take 81 mg by mouth daily    Historical Provider, MD   vitamin D3 (COLECALCIFEROL) 400 UNITS TABS Take by mouth daily    Historical Provider, MD   Misc. Devices (ROLLER [place]) MISC 1 each by Does not apply route daily 8/21/15   Agata Salgado MD   CPAP Machine MISC by Does not apply route nightly    Historical Provider, MD   OXYGEN Inhale 2 L into the lungs nightly. Historical Provider, MD   vitamin B-12 (CYANOCOBALAMIN) 1000 MCG tablet Take 1,500 mcg by mouth daily     Historical Provider, MD   nitroGLYCERIN (NITROSTAT) 0.4 MG SL tablet Place 0.4 mg under the tongue every 5 minutes as needed. Nickie Montanez MD       Future Appointments  Date Time Provider George Louis   11/8/2017 1:00 PM Bayron Burgos MD [facility] AFL [facility] [facility]   2/12/2018 10:15 AM David Barry MD ADV NEPH/HTN [facility]   3/14/2018 3:00 PM Agata Salgado MD [facility] MMA   ,   Diabetes Assessment    Medic Alert ID:  Yes   Meal Planning:  None   How often do you test your blood sugar?:  Daily   Do you have barriers with adherence to non-pharmacologic self-management interventions?  (Nutrition/Exercise/Self-Monitoring):  No   Have you ever had to go to the ED for symptoms of low blood sugar?:  Yes   What is the date of your last ED visit for low blood sugar?:  2/1/16      No patient-reported symptoms          and   Congestive Heart Failure Assessment          No patient-reported symptoms      Symptoms:         Symptom course:  improving   Patient-reported weight (lb):  185   Weight trend:  stable

## 2017-11-15 NOTE — CARE COORDINATION
that indicate a worsening of my condition. Barriers: overwhelmed by complexity of regimen and lack of education  Plan for overcoming my barriers: Patient will be educated on s/s that require emergent care according to Zone Management tool  Confidence: 5/10  Anticipated Goal Completion Date: 12/04/2017              Prior to Admission medications    Medication Sig Start Date End Date Taking? Authorizing Provider   traZODone (DESYREL) 50 MG tablet Take 1 tablet by mouth nightly 9/14/17   Wellington Cunha MD   HYDROcodone-acetaminophen [facility]) 5-325 MG per tablet Take 1 tablet by mouth every 8 hours as needed for Pain . 9/14/17   Wellington Cunha MD   tiotropium (Hillery Slipper) 18 MCG inhalation capsule Inhale 1 capsule into the lungs daily 9/14/17   Wellington Cunha MD   atorvastatin (LIPITOR) 40 MG tablet Take 1 tablet by mouth daily 9/1/17   Wellington Cunha MD   sotalol (BETAPACE) 80 MG tablet Take 40 mg by mouth every evening 5/24/17   Historical Provider, MD   Blood Glucose Monitoring Suppl (TRUE METRIX AIR GLUCOSE METER) W/DEVICE KIT Us prn 5/12/17   Wellington Cunha MD   levothyroxine (SYNTHROID) 25 MCG tablet Take 1 tablet by mouth Daily 4/20/17   Wellington Cunha MD   tiotropium (Hillery Slipper) 18 MCG inhalation capsule Inhale 1 capsule into the lungs daily 3/31/17   Wellington Cunha MD   torsemide (DEMADEX) 20 MG tablet Take 20 mg by mouth 2 times daily Indications: Pt takes 2 tablets 2 times daily  3/6/17   Poli Lion MD   sacubitril-valsartan (ENTRESTO) 24-26 MG per tablet Take 1 tablet by mouth 2 times daily    Kriss Cortez MD   Misc. Devices (COMMODE BEDSIDE) MISC Use prn 3/22/17   Wellington Cunha MD   Elastic Bandages & Supports (PLANTAR FASCIITIS ARCH SLEEVE) [address] Wear nightly 3/22/17   Wellington Cunha MD   glucose blood VI test strips (EXACTECH TEST) strip 1 each by In Vitro route daily As needed.  2/10/17   Wellington Cunha MD   glucose monitoring kit (FREESTYLE) monitoring kit 1 kit by Does not apply route daily Dispense whatever brand is covered by insurance 2/10/17   Alexandro Alvaernga MD   fluticasone-vilanterol (BREO ELLIPTA) 100-25 MCG/INH AEPB inhaler Inhale 1 puff into the lungs as needed    Historical Provider, MD   midodrine (PROAMATINE) 10 MG tablet Take 10 mg by mouth 3 times daily    Zaina Acuna MD   Misc. Devices [facility]) [address] [facility] [facility] wheelchair 7/6/16   Historical Provider, MD   PROVENTIL  (90 BASE) MCG/ACT inhaler Inhale 2 puffs into the lungs every 6 hours as needed for Wheezing 3/2/16   Floyd Dunn MD   aspirin 81 MG tablet Take 81 mg by mouth daily    Historical Provider, MD   vitamin D3 (COLECALCIFEROL) 400 UNITS TABS Take by mouth daily    Historical Provider, MD   Misc. Devices (ROLLER [place]) MISC 1 each by Does not apply route daily 8/21/15   Alexandro Alvarenga MD   CPAP Machine MISC by Does not apply route nightly    Historical Provider, MD   OXYGEN Inhale 2 L into the lungs nightly. Historical Provider, MD   vitamin B-12 (CYANOCOBALAMIN) 1000 MCG tablet Take 1,500 mcg by mouth daily     Historical Provider, MD   nitroGLYCERIN (NITROSTAT) 0.4 MG SL tablet Place 0.4 mg under the tongue every 5 minutes as needed.     Tiffanie Jackson MD       Future Appointments  Date Time Provider George Louis   1/8/2018 2:30 PM Floyd Dunn MD [facility]   2/12/2018 10:15 AM Cory Pinto MD ADV NEPH/HTN [facility]   3/14/2018 3:00 PM Alexandor Alvarenga MD [facility] MMA     General Assessment    Do you have any symptoms that are causing concern?:  No      and   COPD Assessment    Does the patient understand envrionmental exposure?:  Yes  Is the patient able to verbalize Rescue vs. Long Acting medications?:  Yes  Does the patient have a nebulizer?:  No  Does the patient use a space with inhaled medications?:  No     No patient-reported symptoms         Symptoms:      Have you had a recent diagnosis of pneumonia either by PCP or at a hospital?:  No     ,

## 2017-12-06 NOTE — PATIENT INSTRUCTIONS
Patient Education        Purine-Restricted Diet: Care Instructions  Your Care Instructions    Purines are substances that are found in some foods. Your body turns purines into uric acid. High levels of uric acid can cause gout, which is a form of arthritis that causes pain and inflammation in joints. You may be able to help control the amount of uric acid in your body by limiting high-purine foods in your diet. Follow-up care is a key part of your treatment and safety. Be sure to make and go to all appointments, and call your doctor if you are having problems. It's also a good idea to know your test results and keep a list of the medicines you take. How can you care for yourself at home? · Plan your meals and snacks around foods that are low in purines and are safe for you to eat. These foods include:  ¨ Green vegetables and tomatoes. ¨ Fruits. ¨ Whole-grain breads, rice, and cereals. ¨ Eggs, peanut butter, and nuts. ¨ Low-fat milk, cheese, and other milk products. ¨ Popcorn. ¨ Gelatin desserts, chocolate, cocoa, and cakes and sweets, in small amounts. · You can eat certain foods that are medium-high in purines, but eat them only once in a while. These foods include:  ¨ Legumes, such as dried beans and dried peas. You can have 1 cup cooked legumes each day. ¨ Asparagus, cauliflower, spinach, mushrooms, and green peas. ¨ Fish and seafood (other than very high-purine seafood). ¨ Oatmeal, wheat bran, and wheat germ. · Limit very high-purine foods, including:  ¨ Organ meats, such as liver, kidneys, sweetbreads, and brains. ¨ Meats, including diza, beef, pork, and lamb. ¨ Game meats and any other meats in large amounts. ¨ Anchovies, sardines, herring, mackerel, and scallops. ¨ Gravy. ¨ Beer. Where can you learn more? Go to [email or web address]://chpepiceweb.[email or web address] org and sign in to your [employer] account.  Enter F448 in the [facility] box to learn more about \"Purine-Restricted Diet: Care including:  ¨ Organ meats, such as liver, kidneys, sweetbreads, and brains. ¨ Meats, including diaz, beef, pork, and lamb. ¨ Game meats and any other meats in large amounts. ¨ Anchovies, sardines, herring, mackerel, and scallops. ¨ Gravy. ¨ Beer. Where can you learn more? Go to [email or web address] and sign in to your [employer] account. Enter F448 in the [email or web address] box to learn more about \"Purine-Restricted Diet: Care Instructions. \"     If you do not have an account, please click on the \"Sign Up Now\" link. Current as of: May 12, 2017  Content Version: 11.4  © 4589-9410 [employer]. Care instructions adapted under license by [facility] ([facility]). If you have questions about a medical condition or this instruction, always ask your healthcare professional. [place] [address] any warranty or liability for your use of this information. Patient Education        Purine-Restricted Diet: Care Instructions  Your Care Instructions    Purines are substances that are found in some foods. Your body turns purines into uric acid. High levels of uric acid can cause gout, which is a form of arthritis that causes pain and inflammation in joints. You may be able to help control the amount of uric acid in your body by limiting high-purine foods in your diet. Follow-up care is a key part of your treatment and safety. Be sure to make and go to all appointments, and call your doctor if you are having problems. It's also a good idea to know your test results and keep a list of the medicines you take. How can you care for yourself at home? · Plan your meals and snacks around foods that are low in purines and are safe for you to eat. These foods include:  ¨ Green vegetables and tomatoes. ¨ Fruits. ¨ Whole-grain breads, rice, and cereals. ¨ Eggs, peanut butter, and nuts. ¨ Low-fat milk, cheese, and other milk products. ¨ Popcorn.   ¨ Gelatin desserts, chocolate, cocoa, and cakes and sweets, in small amounts. · You can eat certain foods that are medium-high in purines, but eat them only once in a while. These foods include:  ¨ Legumes, such as dried beans and dried peas. You can have 1 cup cooked legumes each day. ¨ Asparagus, cauliflower, spinach, mushrooms, and green peas. ¨ Fish and seafood (other than very high-purine seafood). ¨ Oatmeal, wheat bran, and wheat germ. · Limit very high-purine foods, including:  ¨ Organ meats, such as liver, kidneys, sweetbreads, and brains. ¨ Meats, including diaz, beef, pork, and lamb. ¨ Game meats and any other meats in large amounts. ¨ Anchovies, sardines, herring, mackerel, and scallops. ¨ Gravy. ¨ Beer. Where can you learn more? Go to https://Apokalyyis.[email or web address] org and sign in to your [email or web address] account. Enter F448 in the [facility] box to learn more about \"Purine-Restricted Diet: Care Instructions. \"     If you do not have an account, please click on the \"Sign Up Now\" link. Current as of: May 12, 2017  Content Version: 11.4  © 6333-7165 [employer]. Care instructions adapted under license by [facility] ([facility]). If you have questions about a medical condition or this instruction, always ask your healthcare professional. [place] [address] any warranty or liability for your use of this information.

## 2017-12-07 PROBLEM — M10.9 ACUTE GOUT OF RIGHT HAND: Status: ACTIVE | Noted: 2017-01-01

## 2017-12-07 NOTE — PROGRESS NOTES
Linette Peralta  1950 12/07/17      Patient Active Problem List   Diagnosis    Gout    Depression    [facility]estive [facility] failure) [address]    Obstructive sleep apnea    Family history of brain cancer    Family history of breast cancer    Urinary incontinence, urge    Stool incontinence    Chronic kidney disease (CKD) stage G3a/A1, moderately decreased glomerular filtration rate (GFR) between 45-59 mL/min/1.73 square meter and albuminuria creatinine ratio less than 30 mg/g    Cardiomyopathy ([address]    Hypothyroid    Type 2 diabetes mellitus with stage 3 chronic kidney disease, without long-term current use of insulin ([address]    Combined systolic and diastolic congestive heart failure, NYHA class 3 ([address]    Age-related physical debility    Hypothyroidism       Past Medical History:   Diagnosis Date    Cardiomyopathy ([facility] 2004    [place].   EF of 18%    Cataracts, bilateral     CHF (congestive heart failure) (HCC)     Chronic kidney disease     Chronic renal disease     Depression     Diabetes mellitus, type 2 ([address] 1995    Diverticulosis 12/2013    GERD (gastroesophageal reflux disease)     Gout     Herpes 2013    Hx of blood clots     as a child    Hyperlipidemia     Hypertension     Hypothyroid     Impaired glucose tolerance     Migraine     Obesity     On home oxygen therapy     2 liters    Panic attacks     Sleep apnea     doesnt use    Spinal stenosis     Urinary incontinence, urge 9/9/2014    Vitamin D deficiency          Past Surgical History:   Procedure Laterality Date    APPENDECTOMY      BACK SURGERY  2011    spinal stenosis    BREAST SURGERY Left     cyst    CARDIAC DEFIBRILLATOR PLACEMENT      pt also had a heart cath    CATARACT REMOVAL Left 12/2014    [place]    CHOLECYSTECTOMY      COLONOSCOPY  12-9-13    diverticulosis    ENDOSCOPY, COLON, DIAGNOSTIC      HYSTERECTOMY      1 ovary removed    OTHER SURGICAL HISTORY  12/01/2016    lead extraction    OTHER SURGICAL HISTORY N/A 12/02/2016    pacer lead change    PACEMAKER INSERTION      pacer/ defib    PACEMAKER PLACEMENT  9/9/13    lead change and generator change    UPPER GASTROINTESTINAL ENDOSCOPY  2/16/2016    Minh WE[facility]BROOK    Michelle Singh here for right hand pain: Started almost a week ago. Very painful. Has been taking her leftover Vicodin for the pain. Her friends thinks she has a gout. Her friend who is here reminds her that she had gout in the past.  She does not recall this. Denies any pain in her left hand. Denies any pain in her knee or in her toe    Review of Systems   Musculoskeletal: Positive for joint swelling. Vitals:    12/06/17 1449   BP: 90/60   Site: Right Arm   Position: Sitting   Cuff Size: Large Adult   Pulse: 89   Weight: 189 lb 6.4 oz (85.9 kg)   Height: 5' 2\" (1.575 m)     Body mass index is 34.64 kg/m².      Wt Readings from Last 3 Encounters:   12/06/17 189 lb 6.4 oz (85.9 kg)   11/17/17 190 lb 3.2 oz (86.3 kg)   10/01/17 185 lb (83.9 kg)        Patient Active Problem List   Diagnosis    Gout    Depression    CHF (congestive heart failure) (HCC)    Obstructive sleep apnea    Family history of brain cancer    Family history of breast cancer    Urinary incontinence, urge    Stool incontinence    Chronic kidney disease (CKD) stage G3a/A1, moderately decreased glomerular filtration rate (GFR) between 45-59 mL/min/1.73 square meter and albuminuria creatinine ratio less than 30 mg/g    Cardiomyopathy ([address]    Hypothyroid    Type 2 diabetes mellitus with stage 3 chronic kidney disease, without long-term current use of insulin (HCC)    Combined systolic and diastolic congestive heart failure, NYHA class 3 ([address]    Age-related physical debility    Hypothyroidism     Vitals:    12/06/17 1449   BP: 90/60   Site: Right Arm   Position: Sitting   Cuff Size: Large Adult   Pulse: 89   Weight: 189 lb 6.4 oz (85.9 kg)   Height: 5' 2\" (1.575 m)     Body mass index is 34.64 kg/m². Wt Readings from Last 3 Encounters:   12/06/17 189 lb 6.4 oz (85.9 kg)   11/17/17 190 lb 3.2 oz (86.3 kg)   10/01/17 185 lb (83.9 kg)     BP Readings from Last 3 Encounters:   12/06/17 90/60   11/17/17 118/78   10/02/17 104/67          Physical Exam   Musculoskeletal:        Right elbow: She exhibits normal range of motion. Left elbow: She exhibits normal range of motion. Hands:      Maxime Burch was seen today for hand pain. Diagnoses and all orders for this visit:    Swollen joint  -     URIC ACID          If uric acid is high, will start Treatment. Gave dietary recommendations for gout.

## 2017-12-13 PROBLEM — J18.9 PNEUMONIA: Status: ACTIVE | Noted: 2017-01-01

## 2017-12-18 NOTE — TELEPHONE ENCOUNTER
Transitional Care Management Service-  Initial Post-discharge Communication    Date of discharge: 12-15-17  Facility: [facility]  Non-face-to-face services provided:  · Left message to call.

## 2017-12-20 NOTE — PROGRESS NOTES
9:33 AM     Call initiated 2 business days of discharge: Yes     Interval history/Current status: Condition is fair. Lots of emotional stress due to illness. Physical Exam   Constitutional: She appears well-developed and well-nourished. No distress. Sitting in wheelchair   HENT:   Head: Normocephalic and atraumatic. Right Ear: Hearing, tympanic membrane and external ear normal.   Left Ear: Hearing, tympanic membrane and external ear normal.   Nose: Nose normal. No mucosal edema, rhinorrhea, nose lacerations, sinus tenderness or nasal deformity. Right sinus exhibits no maxillary sinus tenderness and no frontal sinus tenderness. Left sinus exhibits no maxillary sinus tenderness and no frontal sinus tenderness. Mouth/Throat: Oropharynx is clear and moist and mucous membranes are normal. No oropharyngeal exudate, posterior oropharyngeal edema or posterior oropharyngeal erythema. Neck: Neck supple. No tracheal deviation present. No thyromegaly present. Cardiovascular: Normal rate, regular rhythm, S1 normal, S2 normal and normal heart sounds. Exam reveals no gallop and no friction rub. Pulmonary/Chest: Effort normal and breath sounds normal. No respiratory distress. She has no wheezes. She has no rales. Abdominal: Soft. She exhibits no distension and no mass. There is no tenderness. Musculoskeletal: She exhibits no edema. Hands:       Right lower leg: She exhibits no edema. Left lower leg: She exhibits no edema. Bilateral 1+ edema   Lymphadenopathy:        Head (right side): No submental, no submandibular and no posterior auricular adenopathy present. Head (left side): No submental, no submandibular and no posterior auricular adenopathy present. Right cervical: No superficial cervical, no deep cervical and no posterior cervical adenopathy present. Left cervical: No superficial cervical, no deep cervical and no posterior cervical adenopathy present.

## 2017-12-22 NOTE — CARE COORDINATION
Viky [facility] Transitions Follow Up Call    2017    Patient: Kp Berman  Patient : 1950   MRN: <[id number]>  Reason for Admission: There are no discharge diagnoses documented for the most recent discharge. Discharge Date: 12/15/17 RARS: Risk Score: 26.75       Spoke with: Kp Berman    Non-face-to-face services provided:  Obtained and reviewed discharge summary and/or continuity of care documents      Care Transitions Subsequent and Final Call    Subsequent and Final Calls  Do you have any ongoing symptoms?:  No  Do you have any questions related to your medications?:  No  Do you currently have any active services?:  No  Are you currently active with any services?:  Home Health  Do you have any needs or concerns that I can assist you with?:  No  Identified Barriers:  None  Care Transitions Interventions  Other Interventions:          Spoke with Per Dubois for f/u transitions call. Stated she is doing ok. Still has some pain that she rates as 4-5/10. Stated her sweiling in fingers is improved. Continues on Valium, stating is seems to be helping. Denies SOB, CP, fever, chills. FBS today was 128. No current questions or concerns. Reviewed upcoming appts. Stated she just went to Dr Socorro Wilson and didn't think she has to go back so soon. Advised her to contact office regarding appt.     Juliet Cardenas RN BSN   Care Transitions Coordinator  [phone]     Follow Up  Future Appointments  Date Time Provider George Louis   2018 2:30 PM Lacho Marin MD [facility] AFL Lacho Soria   2018 12:00 PM Babita Thomas MD [facility] AFL [facility]   2018 10:15 AM Babita Thomas MD AD[facility] NEPH[facility]   3/14/2018 3:00 PM Kandis Islas MD [facility]       Juliet Cardenas RN

## 2017-12-27 NOTE — CARE COORDINATION
Diannl 45 Transitions Follow Up Call    2017    Patient: Mainor Groves  Patient : 1950   MRN: <[id number]>  Reason for Admission: There are no discharge diagnoses documented for the most recent discharge. Discharge Date: 12/15/17 RARS: Risk Score: 26.75       Spoke with: Yumi regalado    Non-face-to-face services provided:  Obtained and reviewed discharge summary and/or continuity of care documents    Care Transitions Subsequent and Final Call    Subsequent and Final Calls  Do you have any ongoing symptoms?:  No  Have your medications changed?:  No  Do you have any questions related to your medications?:  No  Do you currently have any active services?:  Yes  Are you currently active with any services?:  Home Health  Do you have any needs or concerns that I can assist you with?:  No  Identified Barriers:  None  Care Transitions Interventions  Other Interventions:          Spoke with Yumi Jain for f/u transitions. Stated she is doing OK at home, stated she lost 2.5 pounds in past week and remains on lasix. Stated she fell in shower last week because she wet herself from the lasix regimen and decided to take a shower. Stated she has safety bars and shower seat but that somehow she slipped and landed on her bottom and used her lifeline to contact a friend. Stated she wasn't badly hurt but got up using her left knee and bruised it. State she will not be taking any more showers without assistance. No concerns or questions at this time. Agreed to f/u transitions calls.     Betsy Adair, RN BSN   Care Transitions Coordinator  [phone]     Follow Up  Future Appointments  Date Time Provider George Louis   2018 2:30 PM Lacho Merida MD [facility] AFL Lacho Ran   2018 12:00 PM Clarice Medina MD ADV NEPH/HTN AFL [facility]   2018 10:15 AM Clarice Medina MD ADV NEPH/HTN AFL [facility]   3/14/2018 3:00 PM Harvinder Tolentino MD [facility]       Betsy Adair RN

## 2017-12-28 PROBLEM — I50.23 SYSTOLIC CHF, ACUTE ON CHRONIC (HCC): Status: ACTIVE | Noted: 2017-01-01

## 2017-12-31 NOTE — CARE COORDINATION
Viky 45 Transitions Initial Follow Up Call    Call within 2 business days of discharge: Yes    Patient: Dylon Roman Patient : 1950   MRN: <[id number]>  Reason for Admission: CHF, orthostatis hypotension  Discharge Date: 17 RARS: [facility] Risk Score: 22.75    Facility: [address] [place] [address]    2nd attempt at 24h care transition outreach. Unable to reach patient by phone. Message left stating purpose of call with contact information requesting return call today.     Future Appointments  Date Time Provider George Louis   2018 2:30 PM [address] [place] [address] [address], MD [facility] AFL Lacho Ran   2018 12:00 PM Tomás Su MD ADV NEPH/HTN AFL [facility]   2018 10:15 AM Tomás Su MD ADV NEPH/HTN [facility]   3/14/2018 3:00 PM Porsha Hernandez MD [facility] CHRIS Boles RN
compliant. She cannot always tolerate it. She has appt with pulm 1/8/2018. Encouraged her to take her CPAP mask with her to this appt to have them check for fit and discuss her inability to always tolerate her CPAP to manage her COPD. Explained the CPAP purpose to her again and the importance of compliance. She admits she had not thought to take her mask with her and will do that. Reviewed her inhalers and she confirms she is taking them as ordered. She denies any needs/concerns today. Thankful for call.      Future Appointments  Date Time Provider George Louis   1/8/2018 2:30 PM [address], MD [facility] AFL Lacho Ran   1/12/2018 12:00 PM Tomás Su MD ADV NEPH/HTN [facility]   2/12/2018 10:15 AM Tomás Su MD ADV NEPH[facility]HTN [facility]   3/14/2018 3:00 PM Porsha Hernandez MD [facility] CHRIS Boles RN

## 2018-01-01 ENCOUNTER — TELEPHONE (OUTPATIENT)
Dept: FAMILY MEDICINE CLINIC | Age: 68
End: 2018-01-01

## 2018-01-01 ENCOUNTER — HOSPITAL ENCOUNTER (OUTPATIENT)
Dept: WOMENS IMAGING | Age: 68
Discharge: HOME | End: 2018-04-02
Attending: FAMILY MEDICINE | Admitting: FAMILY MEDICINE

## 2018-01-01 ENCOUNTER — TELEPHONE (OUTPATIENT)
Dept: PHARMACY | Facility: CLINIC | Age: 68
End: 2018-01-01

## 2018-01-01 ENCOUNTER — OFFICE VISIT (OUTPATIENT)
Dept: PULMONOLOGY | Age: 68
End: 2018-01-01

## 2018-01-01 ENCOUNTER — CARE COORDINATION (OUTPATIENT)
Dept: CASE MANAGEMENT | Age: 68
End: 2018-01-01

## 2018-01-01 ENCOUNTER — CARE COORDINATION (OUTPATIENT)
Dept: FAMILY MEDICINE CLINIC | Age: 68
End: 2018-01-01

## 2018-01-01 ENCOUNTER — CARE COORDINATOR VISIT (OUTPATIENT)
Dept: FAMILY MEDICINE CLINIC | Age: 68
End: 2018-01-01

## 2018-01-01 ENCOUNTER — OFFICE VISIT (OUTPATIENT)
Dept: FAMILY MEDICINE CLINIC | Age: 68
End: 2018-01-01

## 2018-01-01 ENCOUNTER — CARE COORDINATION (OUTPATIENT)
Dept: CARE COORDINATION | Age: 68
End: 2018-01-01

## 2018-01-01 ENCOUNTER — PATIENT MESSAGE (OUTPATIENT)
Dept: FAMILY MEDICINE CLINIC | Age: 68
End: 2018-01-01

## 2018-01-01 ENCOUNTER — CARE COORDINATION (OUTPATIENT)
Dept: INTERNAL MEDICINE CLINIC | Age: 68
End: 2018-01-01

## 2018-01-01 ENCOUNTER — HOSPITAL ENCOUNTER (OUTPATIENT)
Dept: OTHER | Age: 68
Discharge: HOME | End: 2018-01-08
Attending: INTERNAL MEDICINE | Admitting: INTERNAL MEDICINE

## 2018-01-01 VITALS
DIASTOLIC BLOOD PRESSURE: 62 MMHG | OXYGEN SATURATION: 95 % | RESPIRATION RATE: 20 BRPM | SYSTOLIC BLOOD PRESSURE: 84 MMHG | HEART RATE: 100 BPM | BODY MASS INDEX: 33.68 KG/M2 | HEIGHT: 62 IN | WEIGHT: 183 LBS

## 2018-01-01 VITALS
WEIGHT: 198.6 LBS | BODY MASS INDEX: 36.55 KG/M2 | RESPIRATION RATE: 16 BRPM | DIASTOLIC BLOOD PRESSURE: 60 MMHG | TEMPERATURE: 97.7 F | HEART RATE: 80 BPM | SYSTOLIC BLOOD PRESSURE: 90 MMHG | HEIGHT: 62 IN | OXYGEN SATURATION: 93 %

## 2018-01-01 VITALS
SYSTOLIC BLOOD PRESSURE: 128 MMHG | DIASTOLIC BLOOD PRESSURE: 58 MMHG | WEIGHT: 192 LBS | BODY MASS INDEX: 36.25 KG/M2 | HEART RATE: 105 BPM | HEIGHT: 61 IN

## 2018-01-01 VITALS
TEMPERATURE: 97.3 F | DIASTOLIC BLOOD PRESSURE: 52 MMHG | HEIGHT: 61 IN | HEART RATE: 101 BPM | SYSTOLIC BLOOD PRESSURE: 100 MMHG | WEIGHT: 188.2 LBS | BODY MASS INDEX: 35.53 KG/M2

## 2018-01-01 DIAGNOSIS — I42.0 DILATED CARDIOMYOPATHY (HCC): ICD-10-CM

## 2018-01-01 DIAGNOSIS — I50.40 COMBINED SYSTOLIC AND DIASTOLIC CONGESTIVE HEART FAILURE, UNSPECIFIED CONGESTIVE HEART FAILURE CHRONICITY: Chronic | ICD-10-CM

## 2018-01-01 DIAGNOSIS — R91.1 LUNG NODULE: ICD-10-CM

## 2018-01-01 DIAGNOSIS — N18.30 TYPE 2 DIABETES MELLITUS WITH STAGE 3 CHRONIC KIDNEY DISEASE, WITHOUT LONG-TERM CURRENT USE OF INSULIN (HCC): Primary | ICD-10-CM

## 2018-01-01 DIAGNOSIS — R06.02 SHORTNESS OF BREATH: ICD-10-CM

## 2018-01-01 DIAGNOSIS — R73.9 HYPERGLYCEMIA: ICD-10-CM

## 2018-01-01 DIAGNOSIS — B34.1 ENTEROVIRUS INFECTION: ICD-10-CM

## 2018-01-01 DIAGNOSIS — J44.9 COPD, MILD (HCC): ICD-10-CM

## 2018-01-01 DIAGNOSIS — Z09 HOSPITAL DISCHARGE FOLLOW-UP: Primary | ICD-10-CM

## 2018-01-01 DIAGNOSIS — Z11.59 NEED FOR HEPATITIS C SCREENING TEST: ICD-10-CM

## 2018-01-01 DIAGNOSIS — E11.22 TYPE 2 DIABETES MELLITUS WITH STAGE 3 CHRONIC KIDNEY DISEASE, WITHOUT LONG-TERM CURRENT USE OF INSULIN (HCC): Primary | ICD-10-CM

## 2018-01-01 DIAGNOSIS — G47.33 OBSTRUCTIVE SLEEP APNEA: Primary | Chronic | ICD-10-CM

## 2018-01-01 DIAGNOSIS — Z78.0 POST-MENOPAUSAL: ICD-10-CM

## 2018-01-01 DIAGNOSIS — I50.43 ACUTE ON CHRONIC COMBINED SYSTOLIC AND DIASTOLIC CONGESTIVE HEART FAILURE, NYHA CLASS 3 (HCC): ICD-10-CM

## 2018-01-01 DIAGNOSIS — Z23 NEED FOR TETANUS BOOSTER: ICD-10-CM

## 2018-01-01 DIAGNOSIS — M62.830 BACK MUSCLE SPASM: Primary | ICD-10-CM

## 2018-01-01 DIAGNOSIS — E11.22 TYPE 2 DIABETES MELLITUS WITH STAGE 3 CHRONIC KIDNEY DISEASE, WITHOUT LONG-TERM CURRENT USE OF INSULIN (HCC): ICD-10-CM

## 2018-01-01 DIAGNOSIS — E03.8 OTHER SPECIFIED HYPOTHYROIDISM: ICD-10-CM

## 2018-01-01 DIAGNOSIS — J18.9 PNEUMONIA DUE TO INFECTIOUS ORGANISM, UNSPECIFIED LATERALITY, UNSPECIFIED PART OF LUNG: Primary | ICD-10-CM

## 2018-01-01 DIAGNOSIS — E11.9 TYPE 2 DIABETES MELLITUS WITHOUT COMPLICATION, WITHOUT LONG-TERM CURRENT USE OF INSULIN (HCC): ICD-10-CM

## 2018-01-01 DIAGNOSIS — R06.02 SOB (SHORTNESS OF BREATH) ON EXERTION: ICD-10-CM

## 2018-01-01 DIAGNOSIS — Z12.39 SCREENING BREAST EXAMINATION: ICD-10-CM

## 2018-01-01 DIAGNOSIS — I50.43 ACUTE ON CHRONIC COMBINED SYSTOLIC AND DIASTOLIC CONGESTIVE HEART FAILURE, NYHA CLASS 3 (HCC): Primary | ICD-10-CM

## 2018-01-01 DIAGNOSIS — R06.02 SHORTNESS OF BREATH: Primary | ICD-10-CM

## 2018-01-01 DIAGNOSIS — N17.9 ACUTE KIDNEY INJURY (HCC): ICD-10-CM

## 2018-01-01 DIAGNOSIS — N18.30 TYPE 2 DIABETES MELLITUS WITH STAGE 3 CHRONIC KIDNEY DISEASE, WITHOUT LONG-TERM CURRENT USE OF INSULIN (HCC): ICD-10-CM

## 2018-01-01 DIAGNOSIS — F41.9 ANXIETY: ICD-10-CM

## 2018-01-01 DIAGNOSIS — I50.9 CONGESTIVE HEART FAILURE, UNSPECIFIED CONGESTIVE HEART FAILURE CHRONICITY, UNSPECIFIED CONGESTIVE HEART FAILURE TYPE: Chronic | ICD-10-CM

## 2018-01-01 DIAGNOSIS — G47.33 OBSTRUCTIVE SLEEP APNEA: Chronic | ICD-10-CM

## 2018-01-01 LAB
ANION GAP SERPL CALCULATED.3IONS-SCNC: 14 MMOL/L (ref 4–16)
BUN BLDV-MCNC: 36 MG/DL (ref 6–23)
CALCIUM SERPL-MCNC: 10.2 MG/DL (ref 8.3–10.6)
CHLORIDE BLD-SCNC: 95 MMOL/L (ref 99–110)
CO2: 31 MMOL/L (ref 21–32)
CREAT SERPL-MCNC: 1 MG/DL (ref 0.6–1.1)
DLCO %PRED: NORMAL
DLCO PRE: NORMAL
FEF 25-75%-POST: 0.85
FEF 25-75%-PRE: 0.74
FEV1-POST: 1.2
FEV1-PRE: 1.11
FEV1/FVC-POST: 73.2
FEV1/FVC-PRE: 71.3
FVC-POST: 1.64
FVC-PRE: 1.56
GFR AFRICAN AMERICAN: >60 ML/MIN/1.73M2
GFR NON-AFRICAN AMERICAN: 55 ML/MIN/1.73M2
GLUCOSE BLD-MCNC: 189 MG/DL (ref 70–99)
HBA1C MFR BLD: 7.4 %
HEPATITIS C ANTIBODY INTERPRETATION: NORMAL
MEP: NORMAL
MIP: NORMAL
POTASSIUM SERPL-SCNC: 5.3 MMOL/L (ref 3.5–5.1)
SODIUM BLD-SCNC: 140 MMOL/L (ref 135–145)
TLC %PRED: NORMAL
TLC PRE: NORMAL
TSH SERPL DL<=0.05 MIU/L-ACNC: 3.14 UIU/ML (ref 0.27–4.2)

## 2018-01-01 PROCEDURE — 99214 OFFICE O/P EST MOD 30 MIN: CPT | Performed by: FAMILY MEDICINE

## 2018-01-01 PROCEDURE — 1123F ACP DISCUSS/DSCN MKR DOCD: CPT | Performed by: FAMILY MEDICINE

## 2018-01-01 PROCEDURE — 3014F SCREEN MAMMO DOC REV: CPT | Performed by: INTERNAL MEDICINE

## 2018-01-01 PROCEDURE — 1090F PRES/ABSN URINE INCON ASSESS: CPT | Performed by: INTERNAL MEDICINE

## 2018-01-01 PROCEDURE — 3023F SPIROM DOC REV: CPT | Performed by: INTERNAL MEDICINE

## 2018-01-01 PROCEDURE — G8399 PT W/DXA RESULTS DOCUMENT: HCPCS | Performed by: FAMILY MEDICINE

## 2018-01-01 PROCEDURE — 1123F ACP DISCUSS/DSCN MKR DOCD: CPT | Performed by: INTERNAL MEDICINE

## 2018-01-01 PROCEDURE — G8428 CUR MEDS NOT DOCUMENT: HCPCS | Performed by: FAMILY MEDICINE

## 2018-01-01 PROCEDURE — 1036F TOBACCO NON-USER: CPT | Performed by: FAMILY MEDICINE

## 2018-01-01 PROCEDURE — 3017F COLORECTAL CA SCREEN DOC REV: CPT | Performed by: FAMILY MEDICINE

## 2018-01-01 PROCEDURE — 4040F PNEUMOC VAC/ADMIN/RCVD: CPT | Performed by: FAMILY MEDICINE

## 2018-01-01 PROCEDURE — 99204 OFFICE O/P NEW MOD 45 MIN: CPT | Performed by: INTERNAL MEDICINE

## 2018-01-01 PROCEDURE — 83036 HEMOGLOBIN GLYCOSYLATED A1C: CPT | Performed by: FAMILY MEDICINE

## 2018-01-01 PROCEDURE — 99495 TRANSJ CARE MGMT MOD F2F 14D: CPT | Performed by: FAMILY MEDICINE

## 2018-01-01 PROCEDURE — G0180 MD CERTIFICATION HHA PATIENT: HCPCS | Performed by: FAMILY MEDICINE

## 2018-01-01 PROCEDURE — 3014F SCREEN MAMMO DOC REV: CPT | Performed by: FAMILY MEDICINE

## 2018-01-01 PROCEDURE — 90715 TDAP VACCINE 7 YRS/> IM: CPT | Performed by: FAMILY MEDICINE

## 2018-01-01 PROCEDURE — G8484 FLU IMMUNIZE NO ADMIN: HCPCS | Performed by: FAMILY MEDICINE

## 2018-01-01 PROCEDURE — 1111F DSCHRG MED/CURRENT MED MERGE: CPT | Performed by: FAMILY MEDICINE

## 2018-01-01 PROCEDURE — G8399 PT W/DXA RESULTS DOCUMENT: HCPCS | Performed by: INTERNAL MEDICINE

## 2018-01-01 PROCEDURE — G8482 FLU IMMUNIZE ORDER/ADMIN: HCPCS | Performed by: FAMILY MEDICINE

## 2018-01-01 PROCEDURE — 1090F PRES/ABSN URINE INCON ASSESS: CPT | Performed by: FAMILY MEDICINE

## 2018-01-01 PROCEDURE — G8417 CALC BMI ABV UP PARAM F/U: HCPCS | Performed by: INTERNAL MEDICINE

## 2018-01-01 PROCEDURE — 1036F TOBACCO NON-USER: CPT | Performed by: INTERNAL MEDICINE

## 2018-01-01 PROCEDURE — 36415 COLL VENOUS BLD VENIPUNCTURE: CPT | Performed by: FAMILY MEDICINE

## 2018-01-01 PROCEDURE — 99213 OFFICE O/P EST LOW 20 MIN: CPT | Performed by: FAMILY MEDICINE

## 2018-01-01 PROCEDURE — G8926 SPIRO NO PERF OR DOC: HCPCS | Performed by: INTERNAL MEDICINE

## 2018-01-01 PROCEDURE — 4040F PNEUMOC VAC/ADMIN/RCVD: CPT | Performed by: INTERNAL MEDICINE

## 2018-01-01 PROCEDURE — G8417 CALC BMI ABV UP PARAM F/U: HCPCS | Performed by: FAMILY MEDICINE

## 2018-01-01 PROCEDURE — 3017F COLORECTAL CA SCREEN DOC REV: CPT | Performed by: INTERNAL MEDICINE

## 2018-01-01 PROCEDURE — 90471 IMMUNIZATION ADMIN: CPT | Performed by: FAMILY MEDICINE

## 2018-01-01 PROCEDURE — G8427 DOCREV CUR MEDS BY ELIG CLIN: HCPCS | Performed by: INTERNAL MEDICINE

## 2018-01-01 PROCEDURE — 3045F PR MOST RECENT HEMOGLOBIN A1C LEVEL 7.0-9.0%: CPT | Performed by: FAMILY MEDICINE

## 2018-01-01 RX ORDER — LANCETS 30 GAUGE
1 EACH MISCELLANEOUS DAILY
Qty: 90 EACH | Refills: 3 | Status: SHIPPED | OUTPATIENT
Start: 2018-01-01 | End: 2018-01-01 | Stop reason: SDUPTHER

## 2018-01-01 RX ORDER — ALBUTEROL SULFATE 90 UG/1
2 AEROSOL, METERED RESPIRATORY (INHALATION) EVERY 6 HOURS PRN
Status: ON HOLD | COMMUNITY
End: 2018-01-01 | Stop reason: HOSPADM

## 2018-01-01 RX ORDER — ALBUTEROL SULFATE 90 UG/1
2 AEROSOL, METERED RESPIRATORY (INHALATION) EVERY 6 HOURS PRN
Qty: 1 INHALER | Refills: 11 | Status: SHIPPED | OUTPATIENT
Start: 2018-01-01

## 2018-01-01 RX ORDER — TORSEMIDE 20 MG/1
40 TABLET ORAL 2 TIMES DAILY
Status: ON HOLD | COMMUNITY
End: 2018-01-01 | Stop reason: HOSPADM

## 2018-01-01 RX ORDER — LANCETS 30 GAUGE
1 EACH MISCELLANEOUS DAILY
Qty: 90 EACH | Refills: 3 | Status: SHIPPED | OUTPATIENT
Start: 2018-01-01 | End: 2018-06-21

## 2018-01-01 RX ORDER — DIAZEPAM 2 MG/1
2 TABLET ORAL EVERY 8 HOURS PRN
Qty: 10 TABLET | Refills: 0 | Status: SHIPPED | OUTPATIENT
Start: 2018-01-01 | End: 2018-01-01

## 2018-01-01 RX ORDER — METOLAZONE 2.5 MG/1
1 TABLET ORAL DAILY
Refills: 11 | Status: ON HOLD | COMMUNITY
Start: 2018-01-01 | End: 2018-01-01 | Stop reason: HOSPADM

## 2018-01-01 RX ORDER — BACLOFEN 10 MG/1
10 TABLET ORAL 3 TIMES DAILY PRN
Qty: 30 TABLET | Refills: 1 | Status: SHIPPED | OUTPATIENT
Start: 2018-01-01

## 2018-01-01 RX ORDER — DIAZEPAM 2 MG/1
2 TABLET ORAL EVERY 6 HOURS PRN
COMMUNITY

## 2018-01-01 RX ORDER — ACETAMINOPHEN 500 MG
500 TABLET ORAL EVERY 6 HOURS PRN
COMMUNITY

## 2018-01-01 RX ORDER — AMIODARONE HYDROCHLORIDE 200 MG/1
1 TABLET ORAL DAILY
Refills: 11 | Status: ON HOLD | COMMUNITY
Start: 2017-01-01 | End: 2018-01-01 | Stop reason: HOSPADM

## 2018-01-01 ASSESSMENT — ENCOUNTER SYMPTOMS
SHORTNESS OF BREATH: 1
BACK PAIN: 1

## 2018-01-01 ASSESSMENT — PULMONARY FUNCTION TESTS
FVC_PRE: 1.56
FEV1_POST: 1.20
FEV1/FVC_POST: 73.2
FEV1_PRE: 1.11
FVC_POST: 1.64
FEV1/FVC_PRE: 71.3

## 2018-01-03 NOTE — CARE COORDINATION
Call to check on pt status. Noted she has been contacted by Care Transitions after recent hospitalization. Left message with contact information & requested a call back. Will continue to monitor. Dennise Todd, RN  Nurse Care Coordinator  [phone]     Ambulatory Care Coordination Note  1/3/2018  CM Risk Score: 16  Karoline Mortality Risk Score: 18.91    ACC: Reshma Valladares, RN    Summary Note: Pt drinking 1000ml of water. She reports wt loss of 4.5# with current wt 181.1. Encouraged pt to weigh at approximately the same time with the same type of clothes on each day & to get the most adequate weight should weigh in the morning after emptying the bladder. Pt reports BP at 111/52 and reports feeling well, but wants to know if this BP reading is 'ok'. Advised pt that if she feels well at 111/52 it must be ok, however if she starts to feel more lethargic, confused, etc then she needs to report that to her PCP or other healthcare provider for evaluation. She has f/u appt sched with cardio & Dr. Claudeen Shilling, encouraged pt to ask about being on the maintenance inhaler as she is only using the rescue inhaler. Talked to pt about diet & balancing, portion sizes and ensuring adequate hydration. Care Coordination Interventions    Program Enrollment:  Complex Care  Referral from Primary Care Provider:  No  Suggested Interventions and Community Resources  Fall Risk Prevention: In Process  Home Health Services: In Process  Zone Management Tools: In Process         Goals Addressed     None          Prior to Admission medications    Medication Sig Start Date End Date Taking? Authorizing Provider   carvedilol (COREG) 3.125 MG tablet Take 1 tablet by mouth 2 times daily 12/29/17   Timothy De Los Santos MD   allopurinol (ZYLOPRIM) 100 MG tablet Take 1 tablet by mouth daily 12/20/17   Willie Bolanos MD   HYDROcodone-acetaminophen [facility]) 5-325 MG per tablet Take 1 tablet by mouth every 8 hours as needed for Pain .  12/20/17 each by Does not apply route daily 8/21/15   Jus Tyson MD   CPAP Machine MISC by Does not apply route nightly    Historical Provider, MD   OXYGEN Inhale 2 L into the lungs nightly. Historical Provider, MD   vitamin B-12 (CYANOCOBALAMIN) 1000 MCG tablet Take 1,500 mcg by mouth daily     Historical Provider, MD   nitroGLYCERIN (NITROSTAT) 0.4 MG SL tablet Place 0.4 mg under the tongue every 5 minutes as needed. Bhakti Finnegan MD       Future Appointments  Date Time Provider George Louis   1/8/2018 2:30 PM [address] [facility] AFL Lacho Ran   1/12/2018 12:00 PM Juan Araiza MD ADV NEPH[facility]HTN [facility]   2/12/2018 10:15 AM Juan Araiza MD [facility] NE[facility]   3/14/2018 3:00 PM Jus Tyson MD [facility] MMA   ,   Diabetes Assessment    Medic Alert ID:  Yes  Meal Planning:  None   How often do you test your blood sugar?:  Daily   Do you have barriers with adherence to non-pharmacologic self-management interventions?  (Nutrition/Exercise/Self-Monitoring):  No   Have you ever had to go to the ED for symptoms of low blood sugar?:  Yes   What is the date of your last ED visit for low blood sugar?:  2/1/16       No patient-reported symptoms      ,   Congestive Heart Failure Assessment    Are you currently restricting fluids?:  Other (Comment: pt is restricting her own fluids to about 1000ml daily)  Do you understand a low sodium diet?:  Yes  Do you salt your food before tasting it?:  No     No patient-reported symptoms      Symptoms:   CHF associated fatigue: Pos      Symptom course:  improving  Patient-reported weight (lb):  181.1  Weight trend:  stable      and   COPD Assessment    Does the patient understand envrionmental exposure?:  Yes  Is the patient able to verbalize Rescue vs. Long Acting medications?:  Yes  Does the patient have a nebulizer?:  No  Does the patient use a space with inhaled medications?:  No     No patient-reported symptoms         Symptoms:

## 2018-01-05 NOTE — TELEPHONE ENCOUNTER
Thom Bai the nurse with [facility] called patient was put on metolazone in the hospital and has been having dizzy spells, seeing flashing lights, nausea and feeling like  going to pass out since went into the hospital 12/28/17. The torsemide was also decreased to 20 mg daily. Per Thom Bai patient is not on potassium and the metolazone could be deleting her potassium. Made patient appointment and earliest patient could make hospital follow up 1/10/2018.

## 2018-01-05 NOTE — CARE COORDINATION
[facility] Transitions Follow Up Call    2018    Patient: Jeffrey Christian  Patient : 1950   MRN: <[id number]>  Reason for Admission: There are no discharge diagnoses documented for the most recent discharge. Discharge Date: 17 RARS: Risk Score: 22.75       Spoke with: [facility] Transitions Subsequent and Final Call    Subsequent and Final Calls  Do you have any ongoing symptoms?:  Yes  Onset of Patient-reported symptoms: In the past 7 days  Interventions for patient-reported symptoms:  Notified PCP/Physician, Other  Do you have any questions related to your medications?:  No  Do you currently have any active services?:  Yes  Are you currently active with any services?:  Home Health  Do you have any needs or concerns that I can assist you with?:  No  Identified Barriers:  None  Care Transitions Interventions  Other Interventions:          Spoke with Milagros Ridley for f/u transitions. Stated she has had occasional ongoing symptoms of dizziness, nausea and flashing lights. Home  RN placed a call to PCP and was referred to Cardiologist regarding medications. [facility] RN concerned about Pt being on lasix and possibly having low potassium. CTC reviewed foods that are rich in potassium. Pt stated her friend went to grocery for her today and picked her up a few things. She had spinach at lunch and will have sweet potato at dinner. [facility] offered to contact Cardiologist but Pt stated [facility] RN is coming today and is gong to call Dr Anthony Chris. Pt denies edema and stated she continues to lose weight. No other concerns at this time. Will continue to follow for transitions.     Juan Antonio Lan, RN BSN   Care Transitions Coordinator  [phone]     Follow Up  Future Appointments  Date Time Provider George Louis   2018 2:30 PM Lacho Hutchison MD [facility] AFL Lacho Ran   1/10/2018 2:30 PM Dannie Romano MD [facility] MMA   2018 12:00 PM Ankur Win MD ADV NEPH/HTN AFL [facility]   2018

## 2018-01-24 NOTE — PROGRESS NOTES
(ZYLOPRIM) 100 MG tablet Take 1 tablet by mouth daily 90 tablet 3    HYDROcodone-acetaminophen (NORCO) 5-325 MG per tablet Take 1 tablet by mouth every 8 hours as needed for Pain . 30 tablet 0    metolazone (ZAROXOLYN) 5 MG tablet Take 1 tablet by mouth daily 30 tablet 0    torsemide (DEMADEX) 20 MG tablet Take 1 tablet by mouth daily 30 tablet 0    colchicine (COLCRYS) 0.6 MG tablet Take 1 tablet by mouth 2 times daily 60 tablet 0    traZODone (DESYREL) 50 MG tablet Take 1 tablet by mouth nightly 90 tablet 3    tiotropium (SPIRIVA HANDIHALER) 18 MCG inhalation capsule Inhale 1 capsule into the lungs daily 90 capsule 3    atorvastatin (LIPITOR) 40 MG tablet Take 1 tablet by mouth daily 90 tablet 2    Blood Glucose Monitoring Suppl (TRUE METRIX AIR GLUCOSE METER) W/DEVICE KIT Us prn 1 kit 0    levothyroxine (SYNTHROID) 25 MCG tablet Take 1 tablet by mouth Daily 30 tablet 11    sacubitril-valsartan (ENTRESTO) 24-26 MG per tablet Take 1 tablet by mouth 2 times daily      Misc. Devices (COMMODE BEDSIDE) MISC Use prn 1 each 0    Elastic Bandages & Supports (PLANTAR FASCIITIS ARCH SLEEVE) MISC Wear nightly 1 each 0    glucose monitoring kit (FREESTYLE) monitoring kit 1 kit by Does not apply route daily Dispense whatever brand is covered by insurance 1 kit 0    fluticasone-vilanterol (BREO ELLIPTA) 100-25 MCG/INH AEPB inhaler Inhale 1 puff into the lungs as needed      midodrine (PROAMATINE) 10 MG tablet Take 10 mg by mouth 3 times daily      Misc. Devices [facility]") MISC wheelchair      PROVENTIL  (90 BASE) MCG/ACT inhaler Inhale 2 puffs into the lungs every 6 hours as needed for Wheezing 1 Inhaler 3    aspirin 81 MG tablet Take 81 mg by mouth daily      vitamin D3 (COLECALCIFEROL) 400 UNITS TABS Take by mouth daily      Misc.  Devices (ROLLER WALKER) MISC 1 each by Does not apply route daily 1 each 0    CPAP Machine MISC by Does not apply route nightly      OXYGEN Inhale 2 L into the lungs nightly.  vitamin B-12 (CYANOCOBALAMIN) 1000 MCG tablet Take 1,500 mcg by mouth daily       nitroGLYCERIN (NITROSTAT) 0.4 MG SL tablet Place 0.4 mg under the tongue every 5 minutes as needed. No current facility-administered medications on file prior to visit. Objective:   Physical Exam   Constitutional: She is oriented to person, place, and time. She appears well-developed and well-nourished. No distress. Musculoskeletal:        Lumbar back: She exhibits decreased range of motion. She exhibits no bony tenderness. Back:    She is very reluctant to twist her back from side to side   Neurological: She is oriented to person, place, and time. Delbert Press Psychiatric: She has a normal mood and affect. Her behavior is normal. Judgment and thought content normal.     Vitals:    01/23/18 1541   BP: (!) 100/52   Site: Right Arm   Position: Sitting   Cuff Size: Large Adult   Pulse: 101   Temp: 97.3 °F (36.3 °C)   TempSrc: Oral   Weight: 188 lb 3.2 oz (85.4 kg)   Height: 5' 1\" (1.549 m)     Body mass index is 35.56 kg/m². Wt Readings from Last 3 Encounters:   01/23/18 188 lb 3.2 oz (85.4 kg)   12/29/17 194 lb (88 kg)   12/20/17 187 lb 3.2 oz (84.9 kg)     BP Readings from Last 3 Encounters:   01/23/18 (!) 100/52   12/29/17 115/72   12/20/17 102/68          No results found for this visit on 01/23/18. Assessment:      1. Back muscle spasm  baclofen (LIORESAL) 10 MG tablet           Plan:        See orders. I think she would qualify for a hospital bed because of her congestive heart failure. She is declining the order for this. She wants her kids to by her different mattress.

## 2018-01-30 NOTE — CARE COORDINATION
1/30/18 11:14 - Call to check on pt status now that Care Transitions is complete. Pt also recently to see PCP with c/o back muscle spasm. Left message with contact information & requested a call back.   Kelly Almodovar RN  Nurse Care Coordinator  [phone]

## 2018-02-06 NOTE — CARE COORDINATION
pt does not use the Breo because she does not like the inhaler, pt will only use the Spiriva and Pro-Air but \"she has to be really bad\" according to the nurse. ACC explained to the nurse, the importance of pt following up with providers as recommended especially the pulmonologist due to recent hospitalizations/ED visits related to SOB, nurse voiced understanding & said she would try to encourage pt to come to appts. Will continue to monitor. Jaya Arellano RN  Nurse Care Coordinator  [phone]     Care Coordination Interventions    Program Enrollment:  Complex Care  Referral from Primary Care Provider:  No  Suggested Interventions and Community Resources  Fall Risk Prevention: In Process  Home Health Services: In Process  Zone Management Tools: In Process         Goals Addressed             Most Recent     Conditions and Symptoms   No change (2/6/2018)             I will follow my Zone Management tool to seek urgent or emergent care. I will notify my provider of any symptoms that indicate a worsening of my condition. Barriers: overwhelmed by complexity of regimen and lack of education  Plan for overcoming my barriers: Patient will be educated on s/s that require emergent care according to Zone Management tool  Confidence: 5/10  Anticipated Goal Completion Date: 12/04/2017              Prior to Admission medications    Medication Sig Start Date End Date Taking?  Authorizing Provider   acetaminophen (TYLENOL) 500 MG tablet Take 500 mg by mouth every 6 hours as needed for Pain   Yes Historical Provider, MD   amiodarone (CORDARONE) 200 MG tablet Take 1 tablet by mouth daily 12/11/17   Historical Provider, MD   metolazone (ZAROXOLYN) 2.5 MG tablet Take 1 tablet by mouth daily 1/17/18   Historical Provider, MD   baclofen (LIORESAL) 10 MG tablet Take 1 tablet by mouth 3 times daily as needed (back pain) 1/23/18   Danay Lopez MD   carvedilol (COREG) 3.125 MG tablet Take 1 tablet by mouth 2 times daily Wheezing 3/2/16   Liz Eason MD   aspirin 81 MG tablet Take 81 mg by mouth daily    Historical Provider, MD   vitamin D3 (COLECALCIFEROL) 400 UNITS TABS Take by mouth daily    Historical Provider, MD   Misc. Devices (ROLLER [place]) MISC 1 each by Does not apply route daily 8/21/15   Willie Bolanos MD   CPAP Machine MISC by Does not apply route nightly    Historical Provider, MD   OXYGEN Inhale 2 L into the lungs nightly. Historical Provider, MD   vitamin B-12 (CYANOCOBALAMIN) 1000 MCG tablet Take 1,500 mcg by mouth daily     Historical Provider, MD   nitroGLYCERIN (NITROSTAT) 0.4 MG SL tablet Place 0.4 mg under the tongue every 5 minutes as needed.     Vicky Pichardo MD       Future Appointments  Date Time Provider George Missy   2/12/2018 10:15 AM Gene Jara MD ADV NEPH/HTN AFL [facility]   3/14/2018 3:00 PM Willie Bolanos MD [facility] MMA     General Assessment    Do you have any symptoms that are causing concern?:  Yes  Progression since Onset:  Unchanged  Reported Symptoms:  Pain (Comment: left knee pain)      and   COPD Assessment    Does the patient understand envrionmental exposure?:  Yes  Is the patient able to verbalize Rescue vs. Long Acting medications?:  Yes  Does the patient have a nebulizer?:  No  Does the patient use a space with inhaled medications?:  No     No patient-reported symptoms         Symptoms:         ,   Congestive Heart Failure Assessment    Are you currently restricting fluids?:  Other (Comment: pt is restricting her own fluids to about 1000ml daily)  Do you understand a low sodium diet?:  Yes  Do you salt your food before tasting it?:  No         Symptoms:      Symptom course:  stable  Patient-reported weight (lb):  179  Weight trend:  fluctuating minimally  Salt intake watch compared to last visit:  stable     ,   Diabetes Assessment    Medic Alert ID:  Yes  Meal Planning:  None   How often do you test your blood sugar?:  Daily   Do you have barriers with

## 2018-03-08 NOTE — CARE COORDINATION
Ambulatory Care Coordination Note  3/8/2018  CM Risk Score: 16  Karoline Mortality Risk Score: 14.73    ACC: Peggye Aase, FLORENCIO    Summary Note: Call to check on pt status. Pt states she continues to struggle with losing weight, contributes it to being bored & snacking more often with little physical activity. Encouraged pt to consider other activities she can do when bored, pt identified coloring & crosswords as alternatives to snacking. Pt continues to check blood sugars at home reports readings to be on average 140-180. Pt continues to monitor weights at home & verbalizes that she would call the doctor if she were to gain more than 2# in one day. Reports weight today to be 187#. Pt reports compliance with medications. Meds continue to be managed by [facility]. nurse, pt unable to do reconciliation at this time. Pt states she would like to be able to exercise more but it causes her to be SOB due to her heart being enlarged, suggested that she speak with her cardiologist to see if she would qualify for cardiac rehab, pt states she will ask. Pt denies any new issues or concerns to be addressed, remains appreciative of ACC follow up. Care Coordination Interventions    Program Enrollment:  Complex Care  Referral from Primary Care Provider:  No  Suggested Interventions and Community Resources  Fall Risk Prevention: In Process  Home Health Services: In Process  Zone Management Tools: In Process         Goals Addressed             Most Recent     Conditions and Symptoms   No change (3/8/2018)             I will follow my Zone Management tool to seek urgent or emergent care. I will notify my provider of any symptoms that indicate a worsening of my condition.     Barriers: overwhelmed by complexity of regimen and lack of education  Plan for overcoming my barriers: Patient will be educated on s/s that require emergent care according to Zone Management tool  Confidence: 5/10  Anticipated Goal Completion Date:

## 2018-03-14 NOTE — PROGRESS NOTES
Patient ID: Layo Baeza [id number]      Diabetes   She presents for her follow-up diabetic visit. She has type 2 diabetes mellitus. Pertinent negatives for hypoglycemia include no dizziness. Her weight is stable. Her overall blood glucose range is 140-180 mg/dl. An ACE inhibitor/angiotensin II receptor blocker is being taken. Back Pain   This is a recurrent problem. The pain is present in the lumbar spine. The pain is moderate. She has tried muscle relaxant (Norco a few times per week) for the symptoms. The treatment provided significant (Believes she has enough Norco to last her another 3 months. Does not need a refill today.) relief. Congestive Heart Failure   Presents for follow-up visit. Associated symptoms include shortness of breath. Pertinent negatives include no unexpected weight change. The symptoms have been stable. Treatment side effects: followed by cardiology. Shortness of Breath   This is a chronic problem. The current episode started more than 1 year ago. The symptoms are aggravated by exercise and any activity. She has tried beta agonist inhalers for the symptoms. The treatment provided no relief. Her past medical history is significant for a heart failure. Hypothyroid: Taking her medication as directed. Her cardiologist thinks her thyroid may be underactive based on her symptoms. Review of Systems   Constitutional: Negative for unexpected weight change. Respiratory: Positive for shortness of breath. Musculoskeletal: Positive for back pain. Neurological: Negative for dizziness.        Patient Active Problem List   Diagnosis    Gout    Depression    CHF (congestive heart failure) (HCC)    Obstructive sleep apnea    Family history of brain cancer    Family history of breast cancer    Urinary incontinence, urge    Stool incontinence    Chronic kidney disease (CKD) stage G3a/A1, moderately decreased glomerular filtration rate (GFR) between 45-59 mL/min/1.73 square meter normal, normal heart sounds and intact distal pulses. Exam reveals no gallop and no friction rub. No murmur heard. Pulses:       Dorsalis pedis pulses are 2+ on the right side, and 2+ on the left side. Posterior tibial pulses are 2+ on the right side, and 2+ on the left side. No carotid bruits   Pulmonary/Chest: Effort normal and breath sounds normal. No respiratory distress. She has no wheezes. She has no rales. Abdominal: Soft. Normal aorta. She exhibits no distension and no mass. There is no tenderness. Musculoskeletal: She exhibits no edema. Neurological: She is alert and oriented to person, place, and time. Intact monofilament test both feet     Skin: Skin is warm and dry. Feet no calluses, no lesions   Psychiatric: She has a normal mood and affect. Vitals:    03/14/18 1452   BP: (!) 128/58   Pulse: 105   Weight: 192 lb (87.1 kg)   Height: 5' 1\" (1.549 m)     Body mass index is 36.28 kg/m². Wt Readings from Last 3 Encounters:   03/14/18 192 lb (87.1 kg)   02/12/18 189 lb 1.6 oz (85.8 kg)   01/23/18 188 lb 3.2 oz (85.4 kg)     BP Readings from Last 3 Encounters:   03/14/18 (!) 128/58   02/12/18 90/60   01/23/18 (!) 100/52          Results for orders placed or performed in visit on 03/14/18   POCT glycosylated hemoglobin (Hb A1C)   Result Value Ref Range    Hemoglobin A1C 7.4 %     The ASCVD Risk score (Duane Velázquez, et al., 2013) failed to calculate for the following reasons:     The patient has a prior MI or stroke diagnosis  Lab Review   Orders Only on 02/09/2018   Component Date Value    Hyaline Casts, UA 02/09/2018 1     WBC, UA 02/09/2018 4     RBC, UA 02/09/2018 2     Epi Cells 02/09/2018 5    Orders Only on 02/09/2018   Component Date Value    Alb 02/09/2018 4.3     Magnesium 02/09/2018 2.10     Sodium 02/09/2018 142     Potassium 02/09/2018 4.1     Chloride 02/09/2018 94*    CO2 02/09/2018 32     Anion Gap 02/09/2018 16     Glucose 02/09/2018 182*    BUN 02/09/2018 43*    CREATININE 02/09/2018 1.0     GFR Non- 02/09/2018 55*    GFR  02/09/2018 >60     Calcium 02/09/2018 9.7     Color, UA 02/09/2018 YELLOW     Clarity, UA 02/09/2018 Clear     Glucose, Ur 02/09/2018 Negative     Bilirubin Urine 02/09/2018 Negative     Ketones, Urine 02/09/2018 Negative     Specific Gravity, UA 02/09/2018 1.018     Blood, Urine 02/09/2018 Negative     pH, UA 02/09/2018 6.5     Protein, UA 02/09/2018 Negative     Urobilinogen, Urine 02/09/2018 0.2     Nitrite, Urine 02/09/2018 Negative     Leukocyte Esterase, Urine 02/09/2018 MODERATE*    Microscopic Examination 02/09/2018 YES     Urine Type 02/09/2018 200 [facility] Outpatient Visit on 01/08/2018   Component Date Value    Sodium 01/08/2018 140     Potassium 01/08/2018 5.3*    Chloride 01/08/2018 95*    CO2 01/08/2018 31     Anion Gap 01/08/2018 14     BUN 01/08/2018 36*    CREATININE 01/08/2018 1.0     Glucose 01/08/2018 189*    Calcium 01/08/2018 10.2     GFR Non- 01/08/2018 55*    GFR  01/08/2018 >60    Admission on 12/28/2017, Discharged on 12/29/2017   Component Date Value    Ventricular Rate 12/28/2017 91     Atrial Rate 12/28/2017 91     P-R Interval 12/28/2017 106     QRS Duration 12/28/2017 190     Q-T Interval 12/28/2017 478     QTc Calculation (Bazett) 12/28/2017 587     P Axis 12/28/2017 72     R Axis 12/28/2017 226     T Axis 12/28/2017 11     Diagnosis 12/28/2017                      Value:Atrial-sensed ventricular-paced rhythm  Biventricular pacemaker detected  Abnormal ECG  When compared with ECG of 12-DEC-2017 23:31,  Vent.  rate has increased BY  21 BPM  Confirmed by Christiana Lopez MD, Maria Elena Dixon ([id number]) on 12/28/2017 5:52:35 PM      WBC 12/28/2017 8.4     RBC 12/28/2017 4.80     Hemoglobin 12/28/2017 13.5     Hematocrit 12/28/2017 42.4     MCV 12/28/2017 88.3     MCH 12/28/2017 28.1     MCHC 12/28/2017 31.8*    RDW 12/28/2017 16.0*    Platelets 44/75/9622 216     MPV 12/28/2017 10.5     Differential Type 12/28/2017 AUTOMATED DIFFERENTIAL     Segs Relative 12/28/2017 71.4*    Lymphocytes % 12/28/2017 18.5*    Monocytes % 12/28/2017 6.7*    Eosinophils % 12/28/2017 2.5     Basophils % 12/28/2017 0.5     Segs Absolute 12/28/2017 6.0     Lymphocytes # 12/28/2017 1.6     Monocytes # 12/28/2017 0.6     Eosinophils # 12/28/2017 0.2     Basophils # 12/28/2017 0.0     Nucleated RBC % 12/28/2017 0.0     Total Nucleated RBC 12/28/2017 0.0     Total Immature Neutrophil 12/28/2017 0.03     Immature Neutrophil % 12/28/2017 0.4     Sodium 12/28/2017 133*    Potassium 12/28/2017 3.6     Chloride 12/28/2017 92*    CO2 12/28/2017 28     BUN 12/28/2017 53*    CREATININE 12/28/2017 1.1     Glucose 12/28/2017 175*    Calcium 12/28/2017 9.8     Alb 12/28/2017 3.7     Total Protein 12/28/2017 7.4     Total Bilirubin 12/28/2017 0.5     ALT 12/28/2017 11     AST 12/28/2017 19     Alkaline Phosphatase 12/28/2017 60     GFR Non- 12/28/2017 49*    GFR  12/28/2017 60*    Anion Gap 12/28/2017 13     Protime 12/28/2017 11.7     INR 12/28/2017 1.03     aPTT 12/28/2017 29.8     Troponin T 12/28/2017 0.013*    Color, UA 12/28/2017 COLORLESS*    Clarity, UA 12/28/2017 CLEAR     Glucose, Urine 12/28/2017 NEGATIVE     Bilirubin Urine 12/28/2017 NEGATIVE     Ketones, Urine 12/28/2017 NEGATIVE     Specific [place], UA 12/28/2017 1.005     Blood, Urine 12/28/2017 NEGATIVE     pH, Urine 12/28/2017 5.0     Protein, UA 12/28/2017 NEGATIVE     Urobilinogen, Urine 12/28/2017 NORMAL     Nitrite Urine, Quantitat* 12/28/2017 NEGATIVE     Leukocyte Esterase, Urine 12/28/2017 NEGATIVE     RBC, UA 12/28/2017 <1     WBC, UA 12/28/2017 <1     Bacteria, UA 12/28/2017 NEGATIVE     Trichomonas, UA 12/28/2017 NONE SEEN     Troponin T 12/28/2017 0.012*    Troponin T 12/29/2017 0.016*    Sodium 12/29/2017 139     Potassium 12/29/2017 3.9     Chloride 12/29/2017 93*    CO2 12/29/2017 31     Anion Gap 12/29/2017 15     BUN 12/29/2017 56*    CREATININE 12/29/2017 1.1     Glucose 12/29/2017 131*    Calcium 12/29/2017 10.4     GFR Non- 12/29/2017 49*    GFR  12/29/2017 60*    Magnesium 12/29/2017 2.3     Triglycerides 12/29/2017 152*    Cholesterol 12/29/2017 121     HDL 12/29/2017 34*    LDL Direct 12/29/2017 68     WBC 12/29/2017 9.5     RBC 12/29/2017 4.71     Hemoglobin 12/29/2017 13.2     Hematocrit 12/29/2017 41.0     MCV 12/29/2017 87.0     MCH 12/29/2017 28.0     MCHC 12/29/2017 32.2     RDW 12/29/2017 15.9*    Platelets 18/39/5455 237     MPV 12/29/2017 10.8     Differential Type 12/29/2017 AUTOMATED DIFFERENTIAL     Segs Relative 12/29/2017 66.1*    Lymphocytes % 12/29/2017 24.8     Monocytes % 12/29/2017 6.5*    Eosinophils % 12/29/2017 1.8     Basophils % 12/29/2017 0.5     Segs Absolute 12/29/2017 6.3     Lymphocytes # 12/29/2017 2.4     Monocytes # 12/29/2017 0.6     Eosinophils # 12/29/2017 0.2     Basophils # 12/29/2017 0.1     Nucleated RBC % 12/29/2017 0.0     Total Nucleated RBC 12/29/2017 0.0     Total Immature Neutrophil 12/29/2017 0.03     Immature Neutrophil % 12/29/2017 0.3     Pro-BNP 12/28/2017 1377*    POC Glucose 12/29/2017 140*   Admission on 12/13/2017, Discharged on 12/15/2017   Component Date Value    WBC 12/12/2017 9.9     RBC 12/12/2017 4.68     Hemoglobin 12/12/2017 13.3     Hematocrit 12/12/2017 41.8     MCV 12/12/2017 89.3     MCH 12/12/2017 28.4     MCHC 12/12/2017 31.8*    RDW 12/12/2017 16.6*    Platelets 22/82/9405 204     MPV 12/12/2017 11.1     Differential Type 12/12/2017 AUTOMATED DIFFERENTIAL     Segs Relative 12/12/2017 72.8*    Lymphocytes % 12/12/2017 20.3*    Monocytes % 12/12/2017 5.4*    Eosinophils % 12/12/2017 0.9     Basophils % 12/12/2017 0.2     Segs Absolute 12/12/2017 7.2     Lymphocytes # 12/12/2017 2.0     Monocytes # 12/12/2017 0.5     Eosinophils # 12/12/2017 0.1     Basophils # 12/12/2017 0.0     Nucleated RBC % 12/12/2017 0.3     Total Nucleated RBC 12/12/2017 0.0     Total Immature Neutrophil 12/12/2017 0.04     Immature Neutrophil % 12/12/2017 0.4     Sodium 12/12/2017 139     Potassium 12/12/2017 4.2     Chloride 12/12/2017 94*    CO2 12/12/2017 26     BUN 12/12/2017 34*    CREATININE 12/12/2017 1.4*    Glucose 12/12/2017 190*    Calcium 12/12/2017 9.7     Alb 12/12/2017 4.1     Total Protein 12/12/2017 7.1     Total Bilirubin 12/12/2017 1.6*    ALT 12/12/2017 15     AST 12/12/2017 31     Alkaline Phosphatase 12/12/2017 75     GFR Non- 12/12/2017 37*    GFR  12/12/2017 45*    Anion Gap 12/12/2017 19*    Troponin T 12/12/2017 0.019*    Pro-BNP 12/12/2017 94202*    pH, Bld 12/12/2017 7.47*    pCO2, Arterial 12/12/2017 31.0*    pO2, Arterial 12/12/2017 96     Base Excess 12/12/2017 0     HCO3, Arterial 12/12/2017 22.6     CO2 Content 12/12/2017 23.6     O2 Sat 12/12/2017 96.8     Carbon Monoxide, Blood 12/12/2017 2.2     Methemoglobin, Arterial 12/12/2017 0.8     Ventricular Rate 12/12/2017 70     Atrial Rate 12/12/2017 70     P-R Interval 12/12/2017 166     QRS Duration 12/12/2017 212     Q-T Interval 12/12/2017 572     QTc Calculation (Bazett) 12/12/2017 617     P Axis 12/12/2017 64     R Axis 12/12/2017 -69     T Axis 12/12/2017 71     Diagnosis 12/12/2017                      Value:Atrial-sensed ventricular-paced rhythm  Biventricular pacemaker detected  Abnormal ECG  When compared with ECG of 08-DEC-2017 01:29,  Vent.  rate has decreased BY  18 BPM  Confirmed by BRANDON Ochoa ([id number]) on 12/13/2017 5:47:23 PM      Lactate 12/12/2017 4.4*    Lipase 12/12/2017 64*    Specimen 12/12/2017 BLOOD     Request Problem 12/12/2017 NONE     Culture 12/12/2017 NO GROWTH AT 5 DAYS     Report Status 12/12/2017 FINAL 12/17/2017     Specimen 12/12/2017 BLOOD     Request Problem 12/12/2017 NONE     Culture 12/12/2017 NO GROWTH AT 5 DAYS     Report Status 12/12/2017 FINAL 12/17/2017     Color, UA 12/13/2017 LT YELLOW*    Clarity, UA 12/13/2017 CLEAR     Glucose, Urine 12/13/2017 NEGATIVE     Bilirubin Urine 12/13/2017 NEGATIVE     Ketones, Urine 12/13/2017 NEGATIVE     Specific [place], UA 12/13/2017 1.006     Blood, Urine 12/13/2017 NEGATIVE     pH, Urine 12/13/2017 6.0     Protein, UA 12/13/2017 100*    Urobilinogen, Urine 12/13/2017 NORMAL     Nitrite Urine, Quantitat* 12/13/2017 NEGATIVE     Leukocyte Esterase, Urine 12/13/2017 NEGATIVE     RBC, UA 12/13/2017 <1     WBC, UA 12/13/2017 1     Bacteria, UA 12/13/2017 NEGATIVE     Squam Epithel, UA 12/13/2017 <1     Mucus, UA 12/13/2017 RARE*    Trichomonas, UA 12/13/2017 NONE SEEN     Hyaline Casts, UA 12/13/2017 0     Amorphous, UA 12/13/2017 RARE     Specimen 12/13/2017 URINE     Request Problem 12/13/2017 NONE     Culture 12/13/2017 NO AEROBIC GROWTH AT 24 HOURS     Report Status 12/13/2017 FINAL 12/14/2017     Protime 12/12/2017 16.4*    INR 12/12/2017 1.44     Magnesium 12/12/2017 2.3     Phosphorus 12/12/2017 5.0*    Procalcitonin 12/13/2017 <0.07     Troponin T 12/13/2017 <0.010     Troponin T 12/13/2017 <0.010     Sodium 12/13/2017 143     Potassium 12/13/2017 3.8     Chloride 12/13/2017 99     CO2 12/13/2017 26     Anion Gap 12/13/2017 18*    BUN 12/13/2017 35*    CREATININE 12/13/2017 1.3*    Glucose 12/13/2017 140*    Calcium 12/13/2017 9.2     GFR Non- 12/13/2017 41*    GFR  12/13/2017 49*    Lactate 12/13/2017 2.2*    Sodium 12/14/2017 141     Potassium 12/14/2017 5.0     Chloride 12/14/2017 98*    CO2 12/14/2017 31     Anion Gap 12/14/2017 12     BUN 12/14/2017 44*    CREATININE 12/14/2017 1.5*    Glucose 12/14/2017 151*    Calcium 12/14/2017 9.8 10/01/2017 87.1     MCH 10/01/2017 28.5     MCHC 10/01/2017 32.8     RDW 10/01/2017 15.7*    Platelets 41/94/4911 216     MPV 10/01/2017 11.0     Differential Type 10/01/2017 AUTOMATED DIFFERENTIAL     Segs Relative 10/01/2017 78.0*    Lymphocytes % 10/01/2017 13.9*    Monocytes % 10/01/2017 6.5*    Eosinophils % 10/01/2017 1.0     Basophils % 10/01/2017 0.3     Segs Absolute 10/01/2017 9.7     Lymphocytes # 10/01/2017 1.7     Monocytes # 10/01/2017 0.8     Eosinophils # 10/01/2017 0.1     Basophils # 10/01/2017 0.0     Nucleated RBC % 10/01/2017 0.0     Total Nucleated RBC 10/01/2017 0.0     Total Immature Neutrophil 10/01/2017 0.04     Immature Neutrophil % 10/01/2017 0.3     Sodium 10/01/2017 137     Potassium 10/01/2017 4.3     Chloride 10/01/2017 94*    CO2 10/01/2017 30     BUN 10/01/2017 28*    CREATININE 10/01/2017 1.1     Glucose 10/01/2017 153*    Calcium 10/01/2017 9.2     Alb 10/01/2017 3.9     Total Protein 10/01/2017 6.8     Total Bilirubin 10/01/2017 1.0     ALT 10/01/2017 6*    AST 10/01/2017 17     Alkaline Phosphatase 10/01/2017 67     GFR Non- 10/01/2017 50*    GFR  10/01/2017 60*    Anion Gap 10/01/2017 13     Pro-BNP 10/01/2017 3490*    Troponin T 10/01/2017 <0.010     Troponin T 10/01/2017 <0.010     Color, UA 10/01/2017 COLORLESS*    Clarity, UA 10/01/2017 CLEAR     Glucose, Urine 10/01/2017 NEGATIVE     Bilirubin Urine 10/01/2017 NEGATIVE     Ketones, Urine 10/01/2017 NEGATIVE     Specific [place], UA 10/01/2017 1.005     Blood, Urine 10/01/2017 NEGATIVE     pH, Urine 10/01/2017 7.0     Protein, UA 10/01/2017 NEGATIVE     Urobilinogen, Urine 10/01/2017 NORMAL     Nitrite Urine, Quantitat* 10/01/2017 NEGATIVE     Leukocyte Esterase, Urine 10/01/2017 SMALL*    RBC, UA 10/01/2017 1     WBC, UA 10/01/2017 11*    Bacteria, UA 10/01/2017 NEGATIVE     Renal Epithelial, Urine 10/01/2017 <1     Mucus, UA

## 2018-03-14 NOTE — LETTER
prescription(s): albuterol sulfate hfa, torsemide, metformin, amiodarone, metolazone, acetaminophen, baclofen, carvedilol, allopurinol, hydrocodone-acetaminophen, torsemide, trazodone, atorvastatin, true metrix air glucose meter, levothyroxine, sacubitril-valsartan, midodrine, wheelchair, proventil hfa, aspirin, vitamin d3, roller walker, OXYGEN, vitamin b-12, diazepam, metolazone, tiotropium, commode bedside, plantar fasciitis arch sleeve, glucose monitoring kit, fluticasone-vilanterol, cpap machine, and nitroglycerin. She is allergic to dilaudid [hydromorphone]; tudorza pressair [aclidinium bromide]; amiodarone; ibuprofen; lidocaine; propoxyphene; spironolactone; tramadol; and percocet [oxycodone-acetaminophen]. I appreciate your assistance in her care and look forward to your findings and recommendations.     Sincerely,                           Avelina Rossi MD

## 2018-04-06 PROBLEM — J18.9 PNEUMONIA: Status: RESOLVED | Noted: 2017-01-01 | Resolved: 2018-01-01

## 2018-04-20 PROBLEM — J44.9 COPD, MILD (HCC): Status: ACTIVE | Noted: 2018-01-01

## 2018-04-20 PROBLEM — R06.02 SHORTNESS OF BREATH: Status: ACTIVE | Noted: 2018-01-01

## 2018-05-05 PROBLEM — J18.9 PNEUMONIA: Status: ACTIVE | Noted: 2018-01-01

## 2018-05-15 PROBLEM — J18.9 PNEUMONIA: Status: RESOLVED | Noted: 2018-01-01 | Resolved: 2018-01-01

## 2018-05-15 PROBLEM — R73.9 HYPERGLYCEMIA: Status: ACTIVE | Noted: 2018-01-01

## 2018-05-15 PROBLEM — N17.9 ACUTE KIDNEY INJURY (HCC): Status: ACTIVE | Noted: 2018-01-01

## 2018-05-15 PROBLEM — I50.23 SYSTOLIC CHF, ACUTE ON CHRONIC (HCC): Status: RESOLVED | Noted: 2017-01-01 | Resolved: 2018-01-01

## 2018-05-26 PROBLEM — R07.9 CHEST PAIN: Status: ACTIVE | Noted: 2018-01-01

## 2018-06-10 PROBLEM — I50.41 ACUTE COMBINED SYSTOLIC AND DIASTOLIC CHF, NYHA CLASS 4 (HCC): Status: ACTIVE | Noted: 2018-01-01

## 2018-06-18 PROBLEM — I50.40 COMBINED SYSTOLIC AND DIASTOLIC CONGESTIVE HEART FAILURE, NYHA CLASS 3 (HCC): Status: RESOLVED | Noted: 2017-03-23 | Resolved: 2018-01-01

## 2018-06-18 PROBLEM — Z51.5 HOSPICE CARE: Status: ACTIVE | Noted: 2018-01-01

## 2018-06-19 PROBLEM — I50.23 ACUTE ON CHRONIC SYSTOLIC HEART FAILURE (HCC): Status: ACTIVE | Noted: 2018-01-01
